# Patient Record
Sex: MALE | Race: WHITE | NOT HISPANIC OR LATINO | URBAN - METROPOLITAN AREA
[De-identification: names, ages, dates, MRNs, and addresses within clinical notes are randomized per-mention and may not be internally consistent; named-entity substitution may affect disease eponyms.]

---

## 2018-01-26 ENCOUNTER — OUTPATIENT (OUTPATIENT)
Dept: OUTPATIENT SERVICES | Facility: HOSPITAL | Age: 78
LOS: 1 days | End: 2018-01-26
Payer: MEDICARE

## 2018-01-26 DIAGNOSIS — Z98.89 OTHER SPECIFIED POSTPROCEDURAL STATES: Chronic | ICD-10-CM

## 2018-01-26 DIAGNOSIS — M17.0 BILATERAL PRIMARY OSTEOARTHRITIS OF KNEE: ICD-10-CM

## 2018-01-26 DIAGNOSIS — Z51.89 ENCOUNTER FOR OTHER SPECIFIED AFTERCARE: ICD-10-CM

## 2018-02-12 PROCEDURE — 97110 THERAPEUTIC EXERCISES: CPT

## 2018-02-12 PROCEDURE — 97162 PT EVAL MOD COMPLEX 30 MIN: CPT

## 2018-02-12 PROCEDURE — G8978: CPT | Mod: CL

## 2018-02-12 PROCEDURE — G8979: CPT | Mod: CK

## 2018-02-12 PROCEDURE — 97010 HOT OR COLD PACKS THERAPY: CPT

## 2018-10-04 ENCOUNTER — OUTPATIENT (OUTPATIENT)
Dept: OUTPATIENT SERVICES | Facility: HOSPITAL | Age: 78
LOS: 1 days | End: 2018-10-04
Payer: MEDICARE

## 2018-10-04 DIAGNOSIS — Z98.89 OTHER SPECIFIED POSTPROCEDURAL STATES: Chronic | ICD-10-CM

## 2018-10-04 DIAGNOSIS — Z96.651 PRESENCE OF RIGHT ARTIFICIAL KNEE JOINT: ICD-10-CM

## 2018-10-04 DIAGNOSIS — Z51.89 ENCOUNTER FOR OTHER SPECIFIED AFTERCARE: ICD-10-CM

## 2018-12-19 PROCEDURE — 97140 MANUAL THERAPY 1/> REGIONS: CPT

## 2018-12-19 PROCEDURE — G8979: CPT | Mod: CJ

## 2018-12-19 PROCEDURE — 97161 PT EVAL LOW COMPLEX 20 MIN: CPT

## 2018-12-19 PROCEDURE — G8980: CPT | Mod: CK

## 2018-12-19 PROCEDURE — 97110 THERAPEUTIC EXERCISES: CPT

## 2018-12-19 PROCEDURE — G8978: CPT | Mod: CK

## 2018-12-19 PROCEDURE — 97010 HOT OR COLD PACKS THERAPY: CPT

## 2019-03-01 ENCOUNTER — APPOINTMENT (OUTPATIENT)
Dept: PULMONOLOGY | Facility: CLINIC | Age: 79
End: 2019-03-01
Payer: MEDICARE

## 2019-03-01 PROCEDURE — 94727 GAS DIL/WSHOT DETER LNG VOL: CPT

## 2019-03-01 PROCEDURE — 94729 DIFFUSING CAPACITY: CPT

## 2019-03-01 PROCEDURE — 94010 BREATHING CAPACITY TEST: CPT

## 2019-03-01 PROCEDURE — 85018 HEMOGLOBIN: CPT | Mod: QW

## 2021-07-09 ENCOUNTER — EMERGENCY (EMERGENCY)
Facility: HOSPITAL | Age: 81
LOS: 1 days | Discharge: HOME | End: 2021-07-09
Attending: EMERGENCY MEDICINE
Payer: MEDICARE

## 2021-07-09 VITALS
HEART RATE: 69 BPM | TEMPERATURE: 98 F | DIASTOLIC BLOOD PRESSURE: 84 MMHG | RESPIRATION RATE: 18 BRPM | SYSTOLIC BLOOD PRESSURE: 157 MMHG | OXYGEN SATURATION: 96 % | WEIGHT: 208.56 LBS | HEIGHT: 70 IN

## 2021-07-09 DIAGNOSIS — Z98.89 OTHER SPECIFIED POSTPROCEDURAL STATES: Chronic | ICD-10-CM

## 2021-07-09 LAB
ALBUMIN SERPL ELPH-MCNC: 3.7 G/DL — SIGNIFICANT CHANGE UP (ref 3.3–5.2)
ALP SERPL-CCNC: 110 U/L — SIGNIFICANT CHANGE UP (ref 40–120)
ALT FLD-CCNC: 54 U/L — HIGH
ANION GAP SERPL CALC-SCNC: 13 MMOL/L — SIGNIFICANT CHANGE UP (ref 5–17)
APPEARANCE UR: ABNORMAL
APTT BLD: 34.2 SEC — SIGNIFICANT CHANGE UP (ref 27.5–35.5)
AST SERPL-CCNC: 48 U/L — HIGH
BACTERIA # UR AUTO: ABNORMAL
BASOPHILS # BLD AUTO: 0.03 K/UL — SIGNIFICANT CHANGE UP (ref 0–0.2)
BASOPHILS NFR BLD AUTO: 0.3 % — SIGNIFICANT CHANGE UP (ref 0–2)
BILIRUB SERPL-MCNC: 0.5 MG/DL — SIGNIFICANT CHANGE UP (ref 0.4–2)
BILIRUB UR-MCNC: NEGATIVE — SIGNIFICANT CHANGE UP
BLD GP AB SCN SERPL QL: SIGNIFICANT CHANGE UP
BUN SERPL-MCNC: 24.4 MG/DL — HIGH (ref 8–20)
CALCIUM SERPL-MCNC: 8.9 MG/DL — SIGNIFICANT CHANGE UP (ref 8.6–10.2)
CHLORIDE SERPL-SCNC: 101 MMOL/L — SIGNIFICANT CHANGE UP (ref 98–107)
CO2 SERPL-SCNC: 24 MMOL/L — SIGNIFICANT CHANGE UP (ref 22–29)
COLOR SPEC: YELLOW — SIGNIFICANT CHANGE UP
CREAT SERPL-MCNC: 1 MG/DL — SIGNIFICANT CHANGE UP (ref 0.5–1.3)
CRP SERPL-MCNC: 142 MG/L — HIGH
DIFF PNL FLD: ABNORMAL
EOSINOPHIL # BLD AUTO: 0.05 K/UL — SIGNIFICANT CHANGE UP (ref 0–0.5)
EOSINOPHIL NFR BLD AUTO: 0.4 % — SIGNIFICANT CHANGE UP (ref 0–6)
EPI CELLS # UR: SIGNIFICANT CHANGE UP
GLUCOSE SERPL-MCNC: 136 MG/DL — HIGH (ref 70–99)
GLUCOSE UR QL: NEGATIVE — SIGNIFICANT CHANGE UP
HCT VFR BLD CALC: 41.3 % — SIGNIFICANT CHANGE UP (ref 39–50)
HGB BLD-MCNC: 13.7 G/DL — SIGNIFICANT CHANGE UP (ref 13–17)
IMM GRANULOCYTES NFR BLD AUTO: 0.8 % — SIGNIFICANT CHANGE UP (ref 0–1.5)
INR BLD: 1.31 RATIO — HIGH (ref 0.88–1.16)
KETONES UR-MCNC: ABNORMAL
LACTATE BLDV-MCNC: 1.2 MMOL/L — SIGNIFICANT CHANGE UP (ref 0.5–2)
LEUKOCYTE ESTERASE UR-ACNC: ABNORMAL
LYMPHOCYTES # BLD AUTO: 0.97 K/UL — LOW (ref 1–3.3)
LYMPHOCYTES # BLD AUTO: 8.3 % — LOW (ref 13–44)
MCHC RBC-ENTMCNC: 31.4 PG — SIGNIFICANT CHANGE UP (ref 27–34)
MCHC RBC-ENTMCNC: 33.2 GM/DL — SIGNIFICANT CHANGE UP (ref 32–36)
MCV RBC AUTO: 94.7 FL — SIGNIFICANT CHANGE UP (ref 80–100)
MONOCYTES # BLD AUTO: 1.27 K/UL — HIGH (ref 0–0.9)
MONOCYTES NFR BLD AUTO: 10.9 % — SIGNIFICANT CHANGE UP (ref 2–14)
NEUTROPHILS # BLD AUTO: 9.23 K/UL — HIGH (ref 1.8–7.4)
NEUTROPHILS NFR BLD AUTO: 79.3 % — HIGH (ref 43–77)
NITRITE UR-MCNC: POSITIVE
PH UR: 6 — SIGNIFICANT CHANGE UP (ref 5–8)
PLATELET # BLD AUTO: 263 K/UL — SIGNIFICANT CHANGE UP (ref 150–400)
POTASSIUM SERPL-MCNC: 3.9 MMOL/L — SIGNIFICANT CHANGE UP (ref 3.5–5.3)
POTASSIUM SERPL-SCNC: 3.9 MMOL/L — SIGNIFICANT CHANGE UP (ref 3.5–5.3)
PROT SERPL-MCNC: 7.2 G/DL — SIGNIFICANT CHANGE UP (ref 6.6–8.7)
PROT UR-MCNC: 30 MG/DL
PROTHROM AB SERPL-ACNC: 15 SEC — HIGH (ref 10.6–13.6)
RBC # BLD: 4.36 M/UL — SIGNIFICANT CHANGE UP (ref 4.2–5.8)
RBC # FLD: 13.2 % — SIGNIFICANT CHANGE UP (ref 10.3–14.5)
RBC CASTS # UR COMP ASSIST: ABNORMAL /HPF (ref 0–4)
SODIUM SERPL-SCNC: 138 MMOL/L — SIGNIFICANT CHANGE UP (ref 135–145)
SP GR SPEC: 1.02 — SIGNIFICANT CHANGE UP (ref 1.01–1.02)
UROBILINOGEN FLD QL: 1
WBC # BLD: 11.64 K/UL — HIGH (ref 3.8–10.5)
WBC # FLD AUTO: 11.64 K/UL — HIGH (ref 3.8–10.5)
WBC UR QL: >50

## 2021-07-09 PROCEDURE — 99283 EMERGENCY DEPT VISIT LOW MDM: CPT

## 2021-07-09 PROCEDURE — 99285 EMERGENCY DEPT VISIT HI MDM: CPT

## 2021-07-09 PROCEDURE — 76870 US EXAM SCROTUM: CPT | Mod: 26

## 2021-07-09 RX ORDER — TAMSULOSIN HYDROCHLORIDE 0.4 MG/1
0.4 CAPSULE ORAL ONCE
Refills: 0 | Status: COMPLETED | OUTPATIENT
Start: 2021-07-09 | End: 2021-07-09

## 2021-07-09 RX ORDER — MORPHINE SULFATE 50 MG/1
4 CAPSULE, EXTENDED RELEASE ORAL ONCE
Refills: 0 | Status: DISCONTINUED | OUTPATIENT
Start: 2021-07-09 | End: 2021-07-09

## 2021-07-09 RX ORDER — SODIUM CHLORIDE 9 MG/ML
1000 INJECTION, SOLUTION INTRAVENOUS ONCE
Refills: 0 | Status: COMPLETED | OUTPATIENT
Start: 2021-07-09 | End: 2021-07-09

## 2021-07-09 RX ORDER — CIPROFLOXACIN LACTATE 400MG/40ML
500 VIAL (ML) INTRAVENOUS ONCE
Refills: 0 | Status: COMPLETED | OUTPATIENT
Start: 2021-07-09 | End: 2021-07-09

## 2021-07-09 RX ORDER — PIPERACILLIN AND TAZOBACTAM 4; .5 G/20ML; G/20ML
3.38 INJECTION, POWDER, LYOPHILIZED, FOR SOLUTION INTRAVENOUS ONCE
Refills: 0 | Status: COMPLETED | OUTPATIENT
Start: 2021-07-09 | End: 2021-07-09

## 2021-07-09 RX ORDER — SODIUM CHLORIDE 9 MG/ML
3 INJECTION INTRAMUSCULAR; INTRAVENOUS; SUBCUTANEOUS ONCE
Refills: 0 | Status: COMPLETED | OUTPATIENT
Start: 2021-07-09 | End: 2021-07-09

## 2021-07-09 RX ADMIN — MORPHINE SULFATE 4 MILLIGRAM(S): 50 CAPSULE, EXTENDED RELEASE ORAL at 21:04

## 2021-07-09 RX ADMIN — SODIUM CHLORIDE 1000 MILLILITER(S): 9 INJECTION, SOLUTION INTRAVENOUS at 23:15

## 2021-07-09 RX ADMIN — PIPERACILLIN AND TAZOBACTAM 200 GRAM(S): 4; .5 INJECTION, POWDER, LYOPHILIZED, FOR SOLUTION INTRAVENOUS at 21:05

## 2021-07-09 RX ADMIN — SODIUM CHLORIDE 3 MILLILITER(S): 9 INJECTION INTRAMUSCULAR; INTRAVENOUS; SUBCUTANEOUS at 21:07

## 2021-07-09 NOTE — ED PROVIDER NOTE - NSFOLLOWUPINSTRUCTIONS_ED_ALL_ED_FT
Cipro 500 mg 2x daily for next 14 days  Flomax 0.4 mg daily at bedtime  Follow up with Urology within 1 week-call to schedule appt  Return sooner for any problems      Urinary Tract Infection    A urinary tract infection (UTI) is an infection of any part of the urinary tract, which includes the kidneys, ureters, bladder, and urethra. Risk factors include ignoring your need to urinate, wiping back to front if female, being an uncircumcised male, and having diabetes or a weak immune system. Symptoms include frequent urination, pain or burning with urination, foul smelling urine, cloudy urine, pain in the lower abdomen, blood in the urine, and fever. If you were prescribed an antibiotic medicine, take it as told by your health care provider. Do not stop taking the antibiotic even if you start to feel better.    SEEK IMMEDIATE MEDICAL CARE IF YOU HAVE ANY OF THE FOLLOWING SYMPTOMS: severe back or abdominal pain, fever, inability to keep fluids or medicine down, dizziness/lightheadedness, or a change in mental status.

## 2021-07-09 NOTE — CONSULT NOTE ADULT - ASSESSMENT
81 year old male with complex left hydrocele and cellulitis/edema  able to void spontaneously without evidence of urinary retention; post void residual 33cc    -oral cipro for 14 days  -pain medication  -start tamsulosin  -warm baths  -follow up urology in 1 week

## 2021-07-09 NOTE — ED PROVIDER NOTE - CLINICAL SUMMARY MEDICAL DECISION MAKING FREE TEXT BOX
will check labs, iv abx, probable ct and urology consult will check labs, iv abx, testicular US, possible ct and urology consult

## 2021-07-09 NOTE — ED PROVIDER NOTE - CPE EDP GASTRO NORM
Emergency Department  [address] [place] [id number]  Phone:  [phone]  Fax:  [phone]                                    Mayte Schwab   MRN: [id number]    Department:   Emergency Department   Date of Visit:  6/17/2020           After Visit Summary Signature Page    I have received my discharge instructions, and my questions have been answered. I have discussed any challenges I see with this plan with the nurse or doctor.    ..........................................................................................................................................  Patient/Patient Representative Signature      ..........................................................................................................................................  Patient Representative Print Name and Relationship to Patient    ..................................................               ................................................  Date                                   Time    ..........................................................................................................................................  Reviewed by Signature/Title    ...................................................              ..............................................  Date                                               Time          22EPIC Rev 08/18        normal...

## 2021-07-09 NOTE — ED PROVIDER NOTE - PATIENT PORTAL LINK FT
You can access the FollowMyHealth Patient Portal offered by [facility] by registering at the following website: [email or web address]. By joining [employer]’s FollowMyHealth portal, you will also be able to view your health information using other applications (apps) compatible with our system.

## 2021-07-09 NOTE — CONSULT NOTE ADULT - ATTENDING COMMENTS
agree with assessment as discussed  1. patient was able to void with small PVR. no need for drainage  2. likely epididymitis, with suspected cellulitis, no need for any drainage, patient discharged with antibiotic treatment, recommended warm baths too  will follow up in 1 week to observe improvement and need for further treatment

## 2021-07-09 NOTE — ED PROVIDER NOTE - PROGRESS NOTE DETAILS
Labs and US results as noted.  Seen by Surgery covering Urology and  d/w Urology/Dr. Sepulveda and recommending d/c on  Cipro and Flomax with f/u in office as outpt

## 2021-07-09 NOTE — ED ADULT NURSE NOTE - OBJECTIVE STATEMENT
Pt. A&Ox3. Pt. respirations even and unlabored. Pt. complaining of scrotal swelling and decreased urine output over the last 4 days. Pt. states he hasn't been able to urinate since 12 pm.

## 2021-07-09 NOTE — ED PROVIDER NOTE - OBJECTIVE STATEMENT
81y male ot with pmhx of afib, BPH, HLD, HTN presents to ED c/o worsening testicular pain with swelling and urinary retention. Pt states he was referred from MD for worsening difficulty passing urine. Pt states he has been passing minimal urine for the past 3/4 days but has been unable to pass urine completely since 12pm today.

## 2021-07-09 NOTE — ED PROVIDER NOTE - CARE PROVIDER_API CALL
Shalonda Sepulveda)  [place] [address]  Phone: [phone]  Fax: [phone]  Follow Up Time:
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
## 2021-07-09 NOTE — CONSULT NOTE ADULT - SUBJECTIVE AND OBJECTIVE BOX
81 year old male presenting to ED with 4 day history of left scrotal swelling and pain. He has never experienced symptoms like this before. Does not recall any skin lesion, scrape or trauma to the area. Has noticed it has been more difficult to urinate, but he is still able to urinate independently. no discharge or drainage from perineum. Has not extended beyond perineum. Has not seen a urologist in many years for which he underwent a bladder diverticulectomy and simple prostatectomy.     denies fevers, chills, chest pain, shortness of breath, palpitations, nausea/vomiting, abdominal pain    PMH: afib, BPH, HLD, HTN  PSH: inguinal hernia, left hip repair, right knee arthroscopy   Medications: as per med rec  Allergies: NKDA  sh: denies toxic habits            Vital Signs Last 24 Hrs  T(C): 36.8 (2021 20:06), Max: 36.8 (2021 20:06)  T(F): 98.3 (2021 20:06), Max: 98.3 (2021 20:06)  HR: 69 (2021 20:06) (69 - 69)  BP: 157/84 (2021 20:06) (157/84 - 157/84)  BP(mean): --  RR: 18 (2021 20:06) (18 - 18)  SpO2: 96% (2021 20:06) (96% - 96%)    Physical Exam:    general: no acute distress, aox3    Respiratory: Breath Sounds equal & clear to auscultation, no accessory muscle use    Cardiovascular: Regular rate & rhythm, normal S1, S2; no murmurs, gallops or rubs    Gastrointestinal: Soft, non-tender, nondistended. mild suprapubic fullness     : no suprapubic tenderness, shaft of penis without edema, mild edema of prepuce, urethra visualized. left scrotum tender to palpation with associated edema, erythema. no drainage. nontender right scrotum. no cellulitis beyond scrotal region. no progression to bilateral groins/thighs     Extremities: No peripheral edema, No cyanosis, clubbing         I&O's Detail      LABS:                        13.7   11.64 )-----------( 263      ( 2021 21:01 )             41.3     07-09    138  |  101  |  24.4<H>  ----------------------------<  136<H>  3.9   |  24.0  |  1.00    Ca    8.9      2021 21:01    TPro  7.2  /  Alb  3.7  /  TBili  0.5  /  DBili  x   /  AST  48<H>  /  ALT  54<H>  /  AlkPhos  110      PT/INR - ( 2021 21:01 )   PT: 15.0 sec;   INR: 1.31 ratio         PTT - ( 2021 21:01 )  PTT:34.2 sec  Urinalysis Basic - ( 2021 22:46 )    Color: Yellow / Appearance: Slightly Turbid / S.020 / pH: x  Gluc: x / Ketone: Small  / Bili: Negative / Urobili: 1   Blood: x / Protein: 30 mg/dL / Nitrite: Positive   Leuk Esterase: Moderate / RBC: 3-5 /HPF / WBC >50   Sq Epi: x / Non Sq Epi: Few / Bacteria: Many        RADIOLOGY & ADDITIONAL STUDIES:    testicular US : FINDINGS:    RIGHT:  Right testis: 3.7 cm x 3.1 cm x  3.2 cm. Normal echogenicity and echotexture with no masses or areas of architectural distortion. Normal arterial and venous blood flow pattern.  Right epididymis: Within normal limits.  Right hydrocele: Mild  Right varicocele: None.    LEFT:  Left testis: 3.0 cm x 2.4 cm x 2.7 cm. Normal echogenicity and echotexture with no masses or areas of architectural distortion. Normal arterial and venous blood flow pattern.  Left epididymis: Not clearly visualized  Left hydrocele: Moderate complex with septations.  Left varicocele: None.    Other: Diffuse scrotal thickening. No abnormal fluid collections.    IMPRESSION:    No evidence of testicular torsion or abnormal testicular echotexture. Left epididymis not clearly visualized likely secondary to epididymitis. Complex left hydrocele and mild right hydrocele. Diffuse scrotal thickening, correlate for cellulitis or edema.

## 2021-07-09 NOTE — ED PROVIDER NOTE - PSH
S/P left knee arthroscopy  1994  S/P right knee arthroscopy  1990  Status post hip surgery  left hip repair 2013

## 2021-07-10 VITALS
SYSTOLIC BLOOD PRESSURE: 174 MMHG | OXYGEN SATURATION: 95 % | TEMPERATURE: 99 F | DIASTOLIC BLOOD PRESSURE: 89 MMHG | HEART RATE: 66 BPM | RESPIRATION RATE: 18 BRPM

## 2021-07-10 PROCEDURE — 83605 ASSAY OF LACTIC ACID: CPT

## 2021-07-10 PROCEDURE — 87077 CULTURE AEROBIC IDENTIFY: CPT

## 2021-07-10 PROCEDURE — 85610 PROTHROMBIN TIME: CPT

## 2021-07-10 PROCEDURE — 87186 SC STD MICRODIL/AGAR DIL: CPT

## 2021-07-10 PROCEDURE — 96374 THER/PROPH/DIAG INJ IV PUSH: CPT

## 2021-07-10 PROCEDURE — 81001 URINALYSIS AUTO W/SCOPE: CPT

## 2021-07-10 PROCEDURE — 80053 COMPREHEN METABOLIC PANEL: CPT

## 2021-07-10 PROCEDURE — 86140 C-REACTIVE PROTEIN: CPT

## 2021-07-10 PROCEDURE — 96375 TX/PRO/DX INJ NEW DRUG ADDON: CPT

## 2021-07-10 PROCEDURE — 36415 COLL VENOUS BLD VENIPUNCTURE: CPT

## 2021-07-10 PROCEDURE — 76870 US EXAM SCROTUM: CPT

## 2021-07-10 PROCEDURE — 86901 BLOOD TYPING SEROLOGIC RH(D): CPT

## 2021-07-10 PROCEDURE — 85730 THROMBOPLASTIN TIME PARTIAL: CPT

## 2021-07-10 PROCEDURE — 86850 RBC ANTIBODY SCREEN: CPT

## 2021-07-10 PROCEDURE — 85025 COMPLETE CBC W/AUTO DIFF WBC: CPT

## 2021-07-10 PROCEDURE — 99284 EMERGENCY DEPT VISIT MOD MDM: CPT | Mod: 25

## 2021-07-10 PROCEDURE — 86900 BLOOD TYPING SEROLOGIC ABO: CPT

## 2021-07-10 PROCEDURE — 87086 URINE CULTURE/COLONY COUNT: CPT

## 2021-07-10 RX ORDER — TAMSULOSIN HYDROCHLORIDE 0.4 MG/1
1 CAPSULE ORAL
Qty: 14 | Refills: 0
Start: 2021-07-10 | End: 2021-07-23

## 2021-07-10 RX ORDER — MOXIFLOXACIN HYDROCHLORIDE TABLETS, 400 MG 400 MG/1
1 TABLET, FILM COATED ORAL
Qty: 28 | Refills: 0
Start: 2021-07-10 | End: 2021-07-23

## 2021-07-10 RX ADMIN — TAMSULOSIN HYDROCHLORIDE 0.4 MILLIGRAM(S): 0.4 CAPSULE ORAL at 00:07

## 2021-07-10 RX ADMIN — Medication 500 MILLIGRAM(S): at 00:07

## 2021-07-10 NOTE — ED ADULT NURSE REASSESSMENT NOTE - NS ED NURSE REASSESS COMMENT FT1
33post void ML in bladder after urinating 200ml on his own after initial bladder scan. . Pt. also urinated again after bolus

## 2021-07-23 ENCOUNTER — INPATIENT (INPATIENT)
Facility: HOSPITAL | Age: 81
LOS: 1 days | Discharge: HOME | DRG: 312 | End: 2021-07-25
Attending: STUDENT IN AN ORGANIZED HEALTH CARE EDUCATION/TRAINING PROGRAM | Admitting: HOSPITALIST
Payer: MEDICARE

## 2021-07-23 VITALS
TEMPERATURE: 98 F | RESPIRATION RATE: 18 BRPM | HEART RATE: 68 BPM | SYSTOLIC BLOOD PRESSURE: 129 MMHG | DIASTOLIC BLOOD PRESSURE: 83 MMHG | WEIGHT: 214.95 LBS | OXYGEN SATURATION: 97 % | HEIGHT: 70 IN

## 2021-07-23 DIAGNOSIS — R55 SYNCOPE AND COLLAPSE: ICD-10-CM

## 2021-07-23 DIAGNOSIS — Z98.89 OTHER SPECIFIED POSTPROCEDURAL STATES: Chronic | ICD-10-CM

## 2021-07-23 LAB
ALBUMIN SERPL ELPH-MCNC: 3.7 G/DL — SIGNIFICANT CHANGE UP (ref 3.3–5.2)
ALP SERPL-CCNC: 101 U/L — SIGNIFICANT CHANGE UP (ref 40–120)
ALT FLD-CCNC: 30 U/L — SIGNIFICANT CHANGE UP
ANION GAP SERPL CALC-SCNC: 11 MMOL/L — SIGNIFICANT CHANGE UP (ref 5–17)
APPEARANCE UR: CLEAR — SIGNIFICANT CHANGE UP
AST SERPL-CCNC: 24 U/L — SIGNIFICANT CHANGE UP
BACTERIA # UR AUTO: NEGATIVE — SIGNIFICANT CHANGE UP
BASOPHILS # BLD AUTO: 0.04 K/UL — SIGNIFICANT CHANGE UP (ref 0–0.2)
BASOPHILS NFR BLD AUTO: 0.5 % — SIGNIFICANT CHANGE UP (ref 0–2)
BILIRUB SERPL-MCNC: 0.3 MG/DL — LOW (ref 0.4–2)
BILIRUB UR-MCNC: NEGATIVE — SIGNIFICANT CHANGE UP
BUN SERPL-MCNC: 18.3 MG/DL — SIGNIFICANT CHANGE UP (ref 8–20)
CALCIUM SERPL-MCNC: 8.9 MG/DL — SIGNIFICANT CHANGE UP (ref 8.6–10.2)
CHLORIDE SERPL-SCNC: 99 MMOL/L — SIGNIFICANT CHANGE UP (ref 98–107)
CO2 SERPL-SCNC: 24 MMOL/L — SIGNIFICANT CHANGE UP (ref 22–29)
COLOR SPEC: YELLOW — SIGNIFICANT CHANGE UP
CREAT SERPL-MCNC: 1.05 MG/DL — SIGNIFICANT CHANGE UP (ref 0.5–1.3)
DIFF PNL FLD: NEGATIVE — SIGNIFICANT CHANGE UP
EOSINOPHIL # BLD AUTO: 0.15 K/UL — SIGNIFICANT CHANGE UP (ref 0–0.5)
EOSINOPHIL NFR BLD AUTO: 2.1 % — SIGNIFICANT CHANGE UP (ref 0–6)
EPI CELLS # UR: NEGATIVE — SIGNIFICANT CHANGE UP
GLUCOSE SERPL-MCNC: 101 MG/DL — HIGH (ref 70–99)
GLUCOSE UR QL: NEGATIVE MG/DL — SIGNIFICANT CHANGE UP
HCT VFR BLD CALC: 42.5 % — SIGNIFICANT CHANGE UP (ref 39–50)
HGB BLD-MCNC: 13.9 G/DL — SIGNIFICANT CHANGE UP (ref 13–17)
IMM GRANULOCYTES NFR BLD AUTO: 0.7 % — SIGNIFICANT CHANGE UP (ref 0–1.5)
KETONES UR-MCNC: NEGATIVE — SIGNIFICANT CHANGE UP
LEUKOCYTE ESTERASE UR-ACNC: ABNORMAL
LYMPHOCYTES # BLD AUTO: 1.11 K/UL — SIGNIFICANT CHANGE UP (ref 1–3.3)
LYMPHOCYTES # BLD AUTO: 15.2 % — SIGNIFICANT CHANGE UP (ref 13–44)
MCHC RBC-ENTMCNC: 30.9 PG — SIGNIFICANT CHANGE UP (ref 27–34)
MCHC RBC-ENTMCNC: 32.7 GM/DL — SIGNIFICANT CHANGE UP (ref 32–36)
MCV RBC AUTO: 94.4 FL — SIGNIFICANT CHANGE UP (ref 80–100)
MONOCYTES # BLD AUTO: 0.63 K/UL — SIGNIFICANT CHANGE UP (ref 0–0.9)
MONOCYTES NFR BLD AUTO: 8.6 % — SIGNIFICANT CHANGE UP (ref 2–14)
NEUTROPHILS # BLD AUTO: 5.32 K/UL — SIGNIFICANT CHANGE UP (ref 1.8–7.4)
NEUTROPHILS NFR BLD AUTO: 72.9 % — SIGNIFICANT CHANGE UP (ref 43–77)
NITRITE UR-MCNC: NEGATIVE — SIGNIFICANT CHANGE UP
PH UR: 6 — SIGNIFICANT CHANGE UP (ref 5–8)
PLATELET # BLD AUTO: 303 K/UL — SIGNIFICANT CHANGE UP (ref 150–400)
POTASSIUM SERPL-MCNC: 4.3 MMOL/L — SIGNIFICANT CHANGE UP (ref 3.5–5.3)
POTASSIUM SERPL-SCNC: 4.3 MMOL/L — SIGNIFICANT CHANGE UP (ref 3.5–5.3)
PROT SERPL-MCNC: 6.9 G/DL — SIGNIFICANT CHANGE UP (ref 6.6–8.7)
PROT UR-MCNC: 15 MG/DL
RBC # BLD: 4.5 M/UL — SIGNIFICANT CHANGE UP (ref 4.2–5.8)
RBC # FLD: 13.2 % — SIGNIFICANT CHANGE UP (ref 10.3–14.5)
RBC CASTS # UR COMP ASSIST: NEGATIVE /HPF — SIGNIFICANT CHANGE UP (ref 0–4)
SARS-COV-2 RNA SPEC QL NAA+PROBE: SIGNIFICANT CHANGE UP
SODIUM SERPL-SCNC: 133 MMOL/L — LOW (ref 135–145)
SP GR SPEC: 1.02 — SIGNIFICANT CHANGE UP (ref 1.01–1.02)
TROPONIN T SERPL-MCNC: <0.01 NG/ML — SIGNIFICANT CHANGE UP (ref 0–0.06)
UROBILINOGEN FLD QL: NEGATIVE MG/DL — SIGNIFICANT CHANGE UP
WBC # BLD: 7.3 K/UL — SIGNIFICANT CHANGE UP (ref 3.8–10.5)
WBC # FLD AUTO: 7.3 K/UL — SIGNIFICANT CHANGE UP (ref 3.8–10.5)
WBC UR QL: SIGNIFICANT CHANGE UP

## 2021-07-23 PROCEDURE — 70450 CT HEAD/BRAIN W/O DYE: CPT | Mod: 26,MA

## 2021-07-23 PROCEDURE — 71045 X-RAY EXAM CHEST 1 VIEW: CPT | Mod: 26

## 2021-07-23 PROCEDURE — 93306 TTE W/DOPPLER COMPLETE: CPT | Mod: 26

## 2021-07-23 PROCEDURE — 99220: CPT

## 2021-07-23 PROCEDURE — 93010 ELECTROCARDIOGRAM REPORT: CPT | Mod: 76

## 2021-07-23 PROCEDURE — 99497 ADVNCD CARE PLAN 30 MIN: CPT | Mod: 25

## 2021-07-23 PROCEDURE — 99223 1ST HOSP IP/OBS HIGH 75: CPT

## 2021-07-23 RX ORDER — ASPIRIN/CALCIUM CARB/MAGNESIUM 324 MG
81 TABLET ORAL DAILY
Refills: 0 | Status: DISCONTINUED | OUTPATIENT
Start: 2021-07-23 | End: 2021-07-25

## 2021-07-23 RX ORDER — DONEPEZIL HYDROCHLORIDE 10 MG/1
10 TABLET, FILM COATED ORAL
Refills: 0 | Status: DISCONTINUED | OUTPATIENT
Start: 2021-07-23 | End: 2021-07-25

## 2021-07-23 RX ORDER — MEMANTINE HYDROCHLORIDE 10 MG/1
10 TABLET ORAL DAILY
Refills: 0 | Status: DISCONTINUED | OUTPATIENT
Start: 2021-07-23 | End: 2021-07-25

## 2021-07-23 RX ORDER — SENNA PLUS 8.6 MG/1
2 TABLET ORAL AT BEDTIME
Refills: 0 | Status: DISCONTINUED | OUTPATIENT
Start: 2021-07-23 | End: 2021-07-25

## 2021-07-23 RX ORDER — AMIODARONE HYDROCHLORIDE 400 MG/1
200 TABLET ORAL DAILY
Refills: 0 | Status: DISCONTINUED | OUTPATIENT
Start: 2021-07-23 | End: 2021-07-25

## 2021-07-23 RX ORDER — ENOXAPARIN SODIUM 100 MG/ML
40 INJECTION SUBCUTANEOUS DAILY
Refills: 0 | Status: DISCONTINUED | OUTPATIENT
Start: 2021-07-23 | End: 2021-07-25

## 2021-07-23 RX ORDER — SIMVASTATIN 20 MG/1
40 TABLET, FILM COATED ORAL AT BEDTIME
Refills: 0 | Status: DISCONTINUED | OUTPATIENT
Start: 2021-07-23 | End: 2021-07-25

## 2021-07-23 RX ORDER — METOPROLOL TARTRATE 50 MG
50 TABLET ORAL DAILY
Refills: 0 | Status: DISCONTINUED | OUTPATIENT
Start: 2021-07-23 | End: 2021-07-25

## 2021-07-23 RX ORDER — TAMSULOSIN HYDROCHLORIDE 0.4 MG/1
0.4 CAPSULE ORAL AT BEDTIME
Refills: 0 | Status: DISCONTINUED | OUTPATIENT
Start: 2021-07-23 | End: 2021-07-25

## 2021-07-23 RX ORDER — CIPROFLOXACIN LACTATE 400MG/40ML
500 VIAL (ML) INTRAVENOUS EVERY 12 HOURS
Refills: 0 | Status: COMPLETED | OUTPATIENT
Start: 2021-07-23 | End: 2021-07-24

## 2021-07-23 RX ADMIN — TAMSULOSIN HYDROCHLORIDE 0.4 MILLIGRAM(S): 0.4 CAPSULE ORAL at 22:21

## 2021-07-23 RX ADMIN — DONEPEZIL HYDROCHLORIDE 10 MILLIGRAM(S): 10 TABLET, FILM COATED ORAL at 18:59

## 2021-07-23 RX ADMIN — Medication 500 MILLIGRAM(S): at 18:59

## 2021-07-23 RX ADMIN — SIMVASTATIN 40 MILLIGRAM(S): 20 TABLET, FILM COATED ORAL at 22:21

## 2021-07-23 NOTE — ED CDU PROVIDER INITIAL DAY NOTE - MEDICAL DECISION MAKING DETAILS
81 year old male with PMh afib, htn, hld, dementia  presents with syncope. pt is been seen in [facility] and is been tx for Uti . As per EMS, the pt went to a deli that he goes to daily. He seemed to be confused as per the deli staff.   R.O syncope or pre syncope   cardiac mon and orthostatic bp   Ekg and trop x 2 ,   telemetry  cbc in am and f.u [place] card ( consult placed by primary care team )

## 2021-07-23 NOTE — ED CDU PROVIDER INITIAL DAY NOTE - PHYSICAL EXAMINATION
Const: AOX3 nontoxic appearing, no apparent respiratory or physical distress.  sister at bed side .   HEENT: NC/AT. Moist mucous membranes.  Eyes: STEVEN. EOMI  Neck: Soft and supple. Full ROM without pain.  Cardiac: Regular rate and regular rhythm. +S1/S2.  Peripheral pulses 2+ and symmetric. No LE edema.  Resp: Speaking in full sentences. No evidence of respiratory distress. No adventitious breath sounds   Abd: Soft, non-tender, non-distended. Normal bowel sounds in all 4 quadrants. No guarding or rebound.  Back: Spine midline and non-tender. No CVAT.  Skin: No rashes, abrasions or lacerations.  Lymph: No cervical lymphadenopathy.  Neuro: Awake, alert & oriented x 2-3. Moves all extremities symmetrically.

## 2021-07-23 NOTE — H&P ADULT - HISTORY OF PRESENT ILLNESS
82 y/o male with PMH of afib, HTN, HLD, NSVT, CAD, dementia was brought to the ED via ambulance for syncope. Patient said he drove himself to deli, while he was on the line waiting to place his order, the staff asked him if he was ok because he did not look right for which he responded yes. Few minute later, he slumped forward on the deli counter. He was unsure if he lost consciousness but did mentioned he lost 6 sec of which he did not recall what happened. As per ED, EMS reported patient was confused on their arrival. He has no fever, chills, nausea, vomiting, chest pain, shortness of breath, abdominal pain, change in bowel/urinary habit, sick contact, recent travel, blurry vision, dizziness, HA, fall, weakness.
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
## 2021-07-23 NOTE — ED CDU PROVIDER INITIAL DAY NOTE - FAMILY HISTORY
Father  Still living? No  Family history of kidney disease, Age at diagnosis: Age Unknown     Mother  Still living? No  Family history of stroke, Age at diagnosis: Age Unknown

## 2021-07-23 NOTE — H&P ADULT - NSICDXPASTSURGICALHX_GEN_ALL_CORE_FT
PAST SURGICAL HISTORY:  S/P left knee arthroscopy 1994    S/P right knee arthroscopy 1990    Status post hip surgery left hip repair 2013

## 2021-07-23 NOTE — H&P ADULT - ASSESSMENT
82 y/o male with PMH of afib, HTN, HLD, NSVT, CAD, dementia was brought to the ED via ambulance for syncope. Patient said he drove himself to [employer], while he was on the line waiting to place his order, the staff asked him if he was ok because he did not look right for which he responded yes. Few minute later, he slumped forward on the deli counter. He was unsure if he lost consciousness but did mentioned he lost 6 sec of which he did not recall what happened. As per ED, EMS reported patient was confused on their arrival.   CT head: no acute intracranial finding, XR: normal. Patient initially placed in observation, cardiology consulted who recommended admit due to patient's cardiac hx; for possible ILR placement on Monday.     Syncope   Admit to telemetry   Troponin x 3 negative   ECG: sinus jaida with 1st degree block   Cardiology consulted   Fall precaution     Afib   Not on AC   Rate controlled   Amiodarone 200mg     HTN/HLD  Metoprolol ER 50mg with holding parameters  Simvastatin 40mg     CAD   Aspirin 81mg   Statin and BB     BPH   Flomax 0.4mg     Dementia   Aricept 10mg     Supportive   DVT prophylaxis: Lovenox   Diet: DASH     Code status: full     Plan of care discussed with patient and niece at bed side

## 2021-07-23 NOTE — H&P ADULT - NSHPPHYSICALEXAM_GEN_ALL_CORE
Vital Signs Last 24 Hrs  T(C): 36.7 (23 Jul 2021 21:58), Max: 36.7 (23 Jul 2021 21:58)  T(F): 98 (23 Jul 2021 21:58), Max: 98 (23 Jul 2021 21:58)  HR: 82 (23 Jul 2021 21:58) (59 - 82)  BP: 131/80 (23 Jul 2021 21:58) (129/83 - 145/70)  BP(mean): --  RR: 18 (23 Jul 2021 21:58) (18 - 18)  SpO2: 98% (23 Jul 2021 21:58) (97% - 98%)

## 2021-07-23 NOTE — ED ADULT TRIAGE NOTE - CHIEF COMPLAINT QUOTE
pt A&Ox 4 BIBA from [facility] after near syncopal episode witnessed by known staff. As per EMS staff reports pt walked into [facility] leaving car door open, blank stared without speaking, then slumped over the counter without LOC which is unlike patient. Upon ED arrival, pt returned to baseline mentation as per EMS. Dr Jackson called to bedside for eval

## 2021-07-23 NOTE — ED ADULT NURSE NOTE - CHIEF COMPLAINT QUOTE
pt A&Ox 4 BIBA from [facility] after near syncopal episode witnessed by known staff. As per EMS staff reports pt walked into [facility] leaving car door open, blank stared without speaking, then slumped over the counter without LOC which is unlike patient. Upon ED arrival, pt returned to baseline mentation as per EMS. Dr Jackson called to bedside for eval

## 2021-07-23 NOTE — H&P ADULT - NSICDXFAMILYHX_GEN_ALL_CORE_FT
FAMILY HISTORY:  Father  Still living? No  Family history of kidney disease, Age at diagnosis: Age Unknown    Mother  Still living? No  Family history of stroke, Age at diagnosis: Age Unknown

## 2021-07-23 NOTE — ED CDU PROVIDER DISPOSITION NOTE - CLINICAL COURSE
pt initially place don obs, but discussed case with cardio Dr. Esparza. pt has a hx of NSVT, CAD with apical MI, and cardio advises ILR on Monday, will admit

## 2021-07-23 NOTE — ED CDU PROVIDER INITIAL DAY NOTE - ATTENDING CONTRIBUTION TO CARE
80 yo M placed in cdu for syncope. pmh htn, hld, dementia, afib.  vss  pe unremarkable    [facility] consult, serial trop, reassess

## 2021-07-23 NOTE — ED ADULT NURSE NOTE - OBJECTIVE STATEMENT
Pt reports he is forgetful often and that his memory lately has been bad. Pt states he remembers going to deli and became weak and leaned on the counter and was then helped to sit down by the staff. Pt states he feels fine at time of RN assessment and there are no focal deficits noted. Pt denies LOC and is noted to be speaking coherently and following commands. Pt able to maex4 with equal strength and purpose.
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
## 2021-07-23 NOTE — ED PROVIDER NOTE - OBJECTIVE STATEMENT
81 year old male with PMh afib, htn, hld presents with syncope. As per EMS, the pt went to a deli that he goes to daily. He seemed to be confused as per the deli staff. he states he remembers entering the deli and being asked what was wrong. Then, as per bystanders, he slumped forward onto the deli counter. Unclear if he lost consciousness or not but he states that he felt as if he was going to. When EMS arrived, they found him to be confused, which improved by his arrival to the ED. He denies chese pain, palpitations, slurred speech extremity weakness, headache.

## 2021-07-24 DIAGNOSIS — F03.90 UNSPECIFIED DEMENTIA, UNSPECIFIED SEVERITY, WITHOUT BEHAVIORAL DISTURBANCE, PSYCHOTIC DISTURBANCE, MOOD DISTURBANCE, AND ANXIETY: ICD-10-CM

## 2021-07-24 DIAGNOSIS — R55 SYNCOPE AND COLLAPSE: ICD-10-CM

## 2021-07-24 DIAGNOSIS — I48.92 UNSPECIFIED ATRIAL FLUTTER: ICD-10-CM

## 2021-07-24 DIAGNOSIS — E78.5 HYPERLIPIDEMIA, UNSPECIFIED: ICD-10-CM

## 2021-07-24 DIAGNOSIS — I10 ESSENTIAL (PRIMARY) HYPERTENSION: ICD-10-CM

## 2021-07-24 DIAGNOSIS — I25.10 ATHEROSCLEROTIC HEART DISEASE OF NATIVE CORONARY ARTERY WITHOUT ANGINA PECTORIS: ICD-10-CM

## 2021-07-24 DIAGNOSIS — Z02.9 ENCOUNTER FOR ADMINISTRATIVE EXAMINATIONS, UNSPECIFIED: ICD-10-CM

## 2021-07-24 LAB
ANION GAP SERPL CALC-SCNC: 11 MMOL/L — SIGNIFICANT CHANGE UP (ref 5–17)
BUN SERPL-MCNC: 21.4 MG/DL — HIGH (ref 8–20)
CALCIUM SERPL-MCNC: 8.8 MG/DL — SIGNIFICANT CHANGE UP (ref 8.6–10.2)
CHLORIDE SERPL-SCNC: 102 MMOL/L — SIGNIFICANT CHANGE UP (ref 98–107)
CO2 SERPL-SCNC: 23 MMOL/L — SIGNIFICANT CHANGE UP (ref 22–29)
COVID-19 SPIKE DOMAIN AB INTERP: POSITIVE
COVID-19 SPIKE DOMAIN ANTIBODY RESULT: >250 U/ML — HIGH
CREAT SERPL-MCNC: 1.04 MG/DL — SIGNIFICANT CHANGE UP (ref 0.5–1.3)
GLUCOSE SERPL-MCNC: 105 MG/DL — HIGH (ref 70–99)
HCT VFR BLD CALC: 40.3 % — SIGNIFICANT CHANGE UP (ref 39–50)
HGB BLD-MCNC: 13.4 G/DL — SIGNIFICANT CHANGE UP (ref 13–17)
MCHC RBC-ENTMCNC: 31.1 PG — SIGNIFICANT CHANGE UP (ref 27–34)
MCHC RBC-ENTMCNC: 33.3 GM/DL — SIGNIFICANT CHANGE UP (ref 32–36)
MCV RBC AUTO: 93.5 FL — SIGNIFICANT CHANGE UP (ref 80–100)
PLATELET # BLD AUTO: 263 K/UL — SIGNIFICANT CHANGE UP (ref 150–400)
POTASSIUM SERPL-MCNC: 4.1 MMOL/L — SIGNIFICANT CHANGE UP (ref 3.5–5.3)
POTASSIUM SERPL-SCNC: 4.1 MMOL/L — SIGNIFICANT CHANGE UP (ref 3.5–5.3)
RBC # BLD: 4.31 M/UL — SIGNIFICANT CHANGE UP (ref 4.2–5.8)
RBC # FLD: 13.2 % — SIGNIFICANT CHANGE UP (ref 10.3–14.5)
SARS-COV-2 IGG+IGM SERPL QL IA: >250 U/ML — HIGH
SARS-COV-2 IGG+IGM SERPL QL IA: POSITIVE
SODIUM SERPL-SCNC: 136 MMOL/L — SIGNIFICANT CHANGE UP (ref 135–145)
WBC # BLD: 7.75 K/UL — SIGNIFICANT CHANGE UP (ref 3.8–10.5)
WBC # FLD AUTO: 7.75 K/UL — SIGNIFICANT CHANGE UP (ref 3.8–10.5)

## 2021-07-24 PROCEDURE — 93010 ELECTROCARDIOGRAM REPORT: CPT

## 2021-07-24 PROCEDURE — 99232 SBSQ HOSP IP/OBS MODERATE 35: CPT | Mod: GC

## 2021-07-24 RX ORDER — SERTRALINE 25 MG/1
50 TABLET, FILM COATED ORAL DAILY
Refills: 0 | Status: DISCONTINUED | OUTPATIENT
Start: 2021-07-24 | End: 2021-07-25

## 2021-07-24 RX ORDER — ALPRAZOLAM 0.25 MG
0.5 TABLET ORAL THREE TIMES A DAY
Refills: 0 | Status: DISCONTINUED | OUTPATIENT
Start: 2021-07-24 | End: 2021-07-25

## 2021-07-24 RX ADMIN — SIMVASTATIN 40 MILLIGRAM(S): 20 TABLET, FILM COATED ORAL at 23:18

## 2021-07-24 RX ADMIN — Medication 1 TABLET(S): at 05:32

## 2021-07-24 RX ADMIN — TAMSULOSIN HYDROCHLORIDE 0.4 MILLIGRAM(S): 0.4 CAPSULE ORAL at 23:18

## 2021-07-24 RX ADMIN — DONEPEZIL HYDROCHLORIDE 10 MILLIGRAM(S): 10 TABLET, FILM COATED ORAL at 16:38

## 2021-07-24 RX ADMIN — Medication 0.5 MILLIGRAM(S): at 16:38

## 2021-07-24 RX ADMIN — Medication 50 MILLIGRAM(S): at 05:32

## 2021-07-24 RX ADMIN — SERTRALINE 50 MILLIGRAM(S): 25 TABLET, FILM COATED ORAL at 14:37

## 2021-07-24 RX ADMIN — Medication 81 MILLIGRAM(S): at 08:11

## 2021-07-24 RX ADMIN — AMIODARONE HYDROCHLORIDE 200 MILLIGRAM(S): 400 TABLET ORAL at 05:32

## 2021-07-24 RX ADMIN — ENOXAPARIN SODIUM 40 MILLIGRAM(S): 100 INJECTION SUBCUTANEOUS at 08:11

## 2021-07-24 RX ADMIN — MEMANTINE HYDROCHLORIDE 10 MILLIGRAM(S): 10 TABLET ORAL at 08:11

## 2021-07-24 RX ADMIN — Medication 500 MILLIGRAM(S): at 05:32

## 2021-07-24 NOTE — PROGRESS NOTE ADULT - PROBLEM SELECTOR PLAN 7
DVT prophylaxis: Lovenox   Diet: DASH   Dispo: likely over the next couple of days     Code status: full     Updated on plan of care with patient at bedside. All questions and concerns addressed. DVT prophylaxis: Lovenox   Diet: DASH   Dispo: likely over the next couple of days     Code status: full     Updated on plan of care with patient at bedside. All questions and concerns addressed. Sister Zoila aware of plan.

## 2021-07-24 NOTE — PROGRESS NOTE ADULT - PROBLEM SELECTOR PLAN 6
AAOx3, but repetitive at times, likely has underlying dementia  - c/w Aricept 10mg AAOx3, but repetitive at times, likely has underlying dementia  - c/w Aricept 10mg  - also on home Zoloft and Xanax PRN

## 2021-07-24 NOTE — PROGRESS NOTE ADULT - ASSESSMENT
80 y/o male with PMH of afib, HTN, HLD, NSVT, CAD, dementia was brought to the ED via ambulance for syncope. Patient said he drove himself to deli, while he was on the line waiting to place his order, the staff asked him if he was ok because he did not look right for which he responded yes. Few minute later, he slumped forward on the deli counter. He was unsure if he lost consciousness but did mentioned he lost 6 sec of which he did not recall what happened. As per ED, EMS reported patient was confused on their arrival.   CT head: no acute intracranial finding, XR: normal. Patient initially placed in observation, cardiology consulted who recommended admit due to patient's cardiac hx; monitor on tele vs. possible ILR placement on Monday.

## 2021-07-24 NOTE — CONSULT NOTE ADULT - ASSESSMENT
Patient is an 81 y/o man with CAD with remote apical MI with known chronically occluded LAD with collaterals on maximal medical therapy, chronic HFpEF (EF 50%), hypertension, aortic sclerosis without stenosis, non-sustained VT without inducible arrhythmia on an EP study on amiodarone therapy, solitary syncopal episode periMI without prior recurrence who presents with loss of consciousness while in a standing position.    Loss of consciousness/Paroxsymal non-sustained VT  - check orthostatic vitals, labs suggestive of hypovolemia   - TTE  - continue amiodarone and metoprolol   - EKG without acute ischemic changes   - likely for EPS vs ILR given his arrhythmia hx  - telemetry monitoring   - likely for outpatient ischemic evaluation     CAD s/p remote MI/ischemic CM with chronic HFpEF  - euvolemic  - serial cardiac enzymes negative   - continue ASA and statin therapy     Further recommendations pending clinical course

## 2021-07-24 NOTE — PROGRESS NOTE ADULT - PROBLEM SELECTOR PLAN 1
Likely cardiac etiology, also + orthostatic vitals  - Admit to telemetry   - Troponin x 3 negative   - ECG: sinus jaida with 1st degree block   - f/u TTE read  - plan for monday ILR if patient agreeable to stay or dc if no event in 24 hours per discussion with Dr. Johnson  - Cardiology consult noted

## 2021-07-24 NOTE — PROGRESS NOTE ADULT - SUBJECTIVE AND OBJECTIVE BOX
[facility] Division of Hospital Medicine Progress Note    CONTACT INFO:  Fina Andrea M.D.  [phone]    Patient is a 81y old  Male who presents with a chief complaint of Syncope (2021 07:37)      SUBJECTIVE / OVERNIGHT EVENTS: Wants to go home and denies LOC during the episode earlier yesterday.     Patient denies chest pain, SOB, abd pain, N/V, fever, chills, dysuria or any other complaints. All remainder ROS negative.     MEDICATIONS  (STANDING):  aMIOdarone    Tablet 200 milliGRAM(s) Oral daily  aspirin enteric coated 81 milliGRAM(s) Oral daily  calcium carbonate 1250 mG  + Vitamin D (OsCal 500 + D) 1 Tablet(s) Oral two times a day  donepezil 10 milliGRAM(s) Oral <User Schedule>  enoxaparin Injectable 40 milliGRAM(s) SubCutaneous daily  memantine 10 milliGRAM(s) Oral daily  metoprolol succinate ER 50 milliGRAM(s) Oral daily  senna 2 Tablet(s) Oral at bedtime  simvastatin 40 milliGRAM(s) Oral at bedtime  tamsulosin 0.4 milliGRAM(s) Oral at bedtime    MEDICATIONS  (PRN):      I&O's Summary      PHYSICAL EXAM:  Vital Signs Last 24 Hrs  T(C): 36.6 (2021 11:17), Max: 36.7 (2021 21:58)  T(F): 97.9 (2021 11:17), Max: 98 (2021 21:58)  HR: 88 (2021 11:17) (52 - 88)  BP: 126/80 (2021 11:17) (111/73 - 145/70)  BP(mean): --  RR: 18 (2021 11:17) (18 - 18)  SpO2: 94% (2021 11:17) (94% - 98%)    General: Appears well developed, mildly anxious and repetitive at times  HEENT: Head; normocephalic, atraumatic. sclera anicteric, MMM, no JVD, neck is supple   CARDIOVASCULAR; Normal S1 and S2.  LUNGS; No rales, rhonchi or wheeze. Normal breath sounds bilaterally.  ABDOMEN ; Soft, nontender without mass or organomegaly. bowel sounds normoactive.  EXTREMITIES; No clubbing, cyanosis or edema. Distal pulses wnl. ROM normal.  SKIN; warm and dry with normal turgor.  NEURO; Alert/oriented x 3/normal motor exam.     PSYCH; normal affect.    LABS:                        13.4   7.75  )-----------( 263      ( 2021 07:17 )             40.3     07-24    136  |  102  |  21.4<H>  ----------------------------<  105<H>  4.1   |  23.0  |  1.04    Ca    8.8      2021 07:17    TPro  6.9  /  Alb  3.7  /  TBili  0.3<L>  /  DBili  x   /  AST  24  /  ALT  30  /  AlkPhos  101  07-23      CARDIAC MARKERS ( 2021 21:51 )  x     / <0.01 ng/mL / x     / x     / x      CARDIAC MARKERS ( 2021 19:09 )  x     / <0.01 ng/mL / x     / x     / x      CARDIAC MARKERS ( 2021 14:27 )  x     / <0.01 ng/mL / x     / x     / x          Urinalysis Basic - ( 2021 15:32 )    Color: Yellow / Appearance: Clear / S.020 / pH: x  Gluc: x / Ketone: Negative  / Bili: Negative / Urobili: Negative mg/dL   Blood: x / Protein: 15 mg/dL / Nitrite: Negative   Leuk Esterase: Trace / RBC: Negative /HPF / WBC 0-2   Sq Epi: x / Non Sq Epi: Negative / Bacteria: Negative        CAPILLARY BLOOD GLUCOSE          RADIOLOGY & ADDITIONAL TESTS:  Results Reviewed:   Imaging Personally Reviewed:  Electrocardiogram Personally Reviewed:

## 2021-07-24 NOTE — GOALS OF CARE CONVERSATION - ADVANCED CARE PLANNING - CONVERSATION DETAILS
Goals of care discussed with patient and his niece at bed side; patient named his son (Jerod) and sister (Zoila Dewitt) as his proxy saying they both have equal right in decision making as per patient. He has not had a concrete decision about DNR/DNI, will discussed it with his family. For now requesting to be full code.
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
## 2021-07-25 ENCOUNTER — TRANSCRIPTION ENCOUNTER (OUTPATIENT)
Age: 81
End: 2021-07-25

## 2021-07-25 VITALS
DIASTOLIC BLOOD PRESSURE: 75 MMHG | OXYGEN SATURATION: 94 % | RESPIRATION RATE: 20 BRPM | HEART RATE: 58 BPM | TEMPERATURE: 98 F | SYSTOLIC BLOOD PRESSURE: 129 MMHG

## 2021-07-25 PROCEDURE — 80053 COMPREHEN METABOLIC PANEL: CPT

## 2021-07-25 PROCEDURE — 99285 EMERGENCY DEPT VISIT HI MDM: CPT | Mod: 25

## 2021-07-25 PROCEDURE — 81001 URINALYSIS AUTO W/SCOPE: CPT

## 2021-07-25 PROCEDURE — 84484 ASSAY OF TROPONIN QUANT: CPT

## 2021-07-25 PROCEDURE — 80048 BASIC METABOLIC PNL TOTAL CA: CPT

## 2021-07-25 PROCEDURE — 86769 SARS-COV-2 COVID-19 ANTIBODY: CPT

## 2021-07-25 PROCEDURE — 97163 PT EVAL HIGH COMPLEX 45 MIN: CPT

## 2021-07-25 PROCEDURE — 71045 X-RAY EXAM CHEST 1 VIEW: CPT

## 2021-07-25 PROCEDURE — U0003: CPT

## 2021-07-25 PROCEDURE — 93005 ELECTROCARDIOGRAM TRACING: CPT

## 2021-07-25 PROCEDURE — G0378: CPT

## 2021-07-25 PROCEDURE — 99239 HOSP IP/OBS DSCHRG MGMT >30: CPT

## 2021-07-25 PROCEDURE — 87086 URINE CULTURE/COLONY COUNT: CPT

## 2021-07-25 PROCEDURE — 93306 TTE W/DOPPLER COMPLETE: CPT

## 2021-07-25 PROCEDURE — U0005: CPT

## 2021-07-25 PROCEDURE — 36415 COLL VENOUS BLD VENIPUNCTURE: CPT

## 2021-07-25 PROCEDURE — 70450 CT HEAD/BRAIN W/O DYE: CPT | Mod: MA

## 2021-07-25 PROCEDURE — 85025 COMPLETE CBC W/AUTO DIFF WBC: CPT

## 2021-07-25 PROCEDURE — 85027 COMPLETE CBC AUTOMATED: CPT

## 2021-07-25 RX ORDER — DONEPEZIL HYDROCHLORIDE 10 MG/1
1 TABLET, FILM COATED ORAL
Qty: 0 | Refills: 0 | DISCHARGE
Start: 2021-07-25

## 2021-07-25 RX ORDER — SODIUM CHLORIDE 9 MG/ML
1000 INJECTION INTRAMUSCULAR; INTRAVENOUS; SUBCUTANEOUS ONCE
Refills: 0 | Status: COMPLETED | OUTPATIENT
Start: 2021-07-25 | End: 2021-07-25

## 2021-07-25 RX ORDER — ASPIRIN/CALCIUM CARB/MAGNESIUM 324 MG
1 TABLET ORAL
Qty: 0 | Refills: 0 | DISCHARGE
Start: 2021-07-25

## 2021-07-25 RX ORDER — MEMANTINE HYDROCHLORIDE 10 MG/1
1 TABLET ORAL
Qty: 0 | Refills: 0 | DISCHARGE
Start: 2021-07-25

## 2021-07-25 RX ORDER — SERTRALINE 25 MG/1
1 TABLET, FILM COATED ORAL
Qty: 0 | Refills: 0 | DISCHARGE
Start: 2021-07-25

## 2021-07-25 RX ORDER — HALOPERIDOL DECANOATE 100 MG/ML
2.5 INJECTION INTRAMUSCULAR ONCE
Refills: 0 | Status: COMPLETED | OUTPATIENT
Start: 2021-07-25 | End: 2021-07-25

## 2021-07-25 RX ADMIN — SODIUM CHLORIDE 500 MILLILITER(S): 9 INJECTION INTRAMUSCULAR; INTRAVENOUS; SUBCUTANEOUS at 11:41

## 2021-07-25 RX ADMIN — Medication 50 MILLIGRAM(S): at 06:52

## 2021-07-25 RX ADMIN — MEMANTINE HYDROCHLORIDE 10 MILLIGRAM(S): 10 TABLET ORAL at 13:54

## 2021-07-25 RX ADMIN — Medication 81 MILLIGRAM(S): at 13:54

## 2021-07-25 RX ADMIN — AMIODARONE HYDROCHLORIDE 200 MILLIGRAM(S): 400 TABLET ORAL at 06:52

## 2021-07-25 RX ADMIN — SERTRALINE 50 MILLIGRAM(S): 25 TABLET, FILM COATED ORAL at 13:54

## 2021-07-25 RX ADMIN — Medication 1 TABLET(S): at 06:52

## 2021-07-25 RX ADMIN — ENOXAPARIN SODIUM 40 MILLIGRAM(S): 100 INJECTION SUBCUTANEOUS at 13:53

## 2021-07-25 RX ADMIN — HALOPERIDOL DECANOATE 2.5 MILLIGRAM(S): 100 INJECTION INTRAMUSCULAR at 01:54

## 2021-07-25 NOTE — PROGRESS NOTE ADULT - PROBLEM SELECTOR PLAN 7
DVT prophylaxis: Lovenox   Diet: DASH   Dispo: likely over the next couple of days, pending PT eval and social work eval    Code status: full     Updated on plan of care with patient at bedside. All questions and concerns addressed. Sister Zoila aware of plan. DVT prophylaxis: Lovenox   Diet: DASH   Dispo: likely over the next couple of days, pending PT eval and social work eval    Code status: full     Updated on plan of care with patient at bedside and sister Zoila on the phone. All questions and concerns addressed.

## 2021-07-25 NOTE — PROGRESS NOTE ADULT - PROBLEM SELECTOR PLAN 6
AAOx3, but repetitive at times, likely has underlying dementia  - c/w Aricept 10mg  - also on home Zoloft and Xanax PRN

## 2021-07-25 NOTE — DISCHARGE NOTE PROVIDER - CARE PROVIDER_API CALL
Jeffery Johnson)  Internal Medicine  [address] [place] [address]  Phone: [phone]  Fax: [phone]  Follow Up Time: 1 week

## 2021-07-25 NOTE — PROGRESS NOTE ADULT - PROBLEM SELECTOR PLAN 2
- Not on AC   - Rate controlled   - Amiodarone 200mg
- Not on AC   - Rate controlled   - Amiodarone 200mg

## 2021-07-25 NOTE — PROGRESS NOTE ADULT - ASSESSMENT
82 y/o male with PMH of afib, HTN, HLD, NSVT, CAD, dementia was brought to the ED via ambulance for syncope. Patient said he drove himself to deli, while he was on the line waiting to place his order, the staff asked him if he was ok because he did not look right for which he responded yes. Few minute later, he slumped forward on the deli counter. He was unsure if he lost consciousness but did mentioned he lost 6 sec of which he did not recall what happened. As per ED, EMS reported patient was confused on their arrival.   CT head: no acute intracranial finding, XR: normal. Patient initially placed in observation, cardiology consulted who recommended admit due to patient's cardiac hx; monitor on tele vs. possible ILR placement on Monday.     Also with gait instability, pending PT eval. 80 y/o male with PMH of afib, HTN, HLD, NSVT, CAD, dementia was brought to the ED via ambulance for syncope. Patient said he drove himself to deli, while he was on the line waiting to place his order, the staff asked him if he was ok because he did not look right for which he responded yes. Few minute later, he slumped forward on the deli counter. He was unsure if he lost consciousness but did mentioned he lost 6 sec of which he did not recall what happened. As per ED, EMS reported patient was confused on their arrival.   CT head: no acute intracranial finding, XR: normal. Patient initially placed in observation, cardiology consulted who recommended admit due to patient's cardiac hx; monitor on tele vs. possible ILR placement on Monday vs. outpatient ILR.     Also with gait instability Pt eval - no rehab needs

## 2021-07-25 NOTE — PROGRESS NOTE ADULT - PROBLEM SELECTOR PLAN 1
Likely cardiac etiology, also + orthostatic vitals  - Admit to telemetry   - Troponin x 3 negative   - ECG: sinus jaida with 1st degree block   - TTE: Left ventricular ejection fraction, by visual estimation, is 55 to 60%. Grade II diastolic dysfunction.  - plan for monday ILR if PT not discharged, NPO Sunday night  - Cardiology consult noted Likely cardiac etiology, also + orthostatic vitals  - Admit to telemetry   - Troponin x 3 negative   - ECG: sinus jaida with 1st degree block   - TTE: Left ventricular ejection fraction, by visual estimation, is 55 to 60%. Grade II diastolic dysfunction.  - plan for monday ILR if PT not discharged, NPO Sunday night  - Cardiology consult noted  - +orthostatic on 7/25, will bolus 1L
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
## 2021-07-25 NOTE — PHYSICAL THERAPY INITIAL EVALUATION ADULT - GENERAL OBSERVATIONS, REHAB EVAL
Pt received in sitting at edge of stretcher, + IV Loc, +Tele, breathing on RA in NAD, in 0/10 pain, agreeable to PT evaluation

## 2021-07-25 NOTE — DISCHARGE NOTE NURSING/CASE MANAGEMENT/SOCIAL WORK - NSDCFUADDAPPT_GEN_ALL_CORE_FT
Cardiology office will call you to schedule an appointment for loop recorder. If you do not hear with within the next few days, please call [phone] to make an appointment yourself.

## 2021-07-25 NOTE — PROGRESS NOTE ADULT - SUBJECTIVE AND OBJECTIVE BOX
[facility] Division of Hospital Medicine Progress Note    CONTACT INFO:  Fina Andrea M.D.  [phone]    Patient is a 81y old  Male who presents with a chief complaint of Syncope (2021 09:53)      SUBJECTIVE / OVERNIGHT EVENTS: no acute complaints. Reports feeling confused and unable to recall why he is still in the hospital. Lives at home by himself. Per RN, +orthostatic this AM    Patient denies chest pain, SOB, abd pain, N/V, fever, chills, dysuria or any other complaints. All remainder ROS negative.     MEDICATIONS  (STANDING):  aMIOdarone    Tablet 200 milliGRAM(s) Oral daily  aspirin enteric coated 81 milliGRAM(s) Oral daily  calcium carbonate 1250 mG  + Vitamin D (OsCal 500 + D) 1 Tablet(s) Oral two times a day  donepezil 10 milliGRAM(s) Oral <User Schedule>  enoxaparin Injectable 40 milliGRAM(s) SubCutaneous daily  memantine 10 milliGRAM(s) Oral daily  metoprolol succinate ER 50 milliGRAM(s) Oral daily  senna 2 Tablet(s) Oral at bedtime  sertraline 50 milliGRAM(s) Oral daily  simvastatin 40 milliGRAM(s) Oral at bedtime  tamsulosin 0.4 milliGRAM(s) Oral at bedtime    MEDICATIONS  (PRN):  ALPRAZolam 0.5 milliGRAM(s) Oral three times a day PRN agitation/anxiety      I&O's Summary      PHYSICAL EXAM:  Vital Signs Last 24 Hrs  T(C): 36.2 (2021 11:26), Max: 37 (2021 19:24)  T(F): 97.1 (2021 11:26), Max: 98.6 (2021 19:24)  HR: 62 (2021 11:26) (57 - 68)  BP: 99/63 (2021 11:26) (99/63 - 160/81)  BP(mean): --  RR: 18 (2021 11:26) (18 - 20)  SpO2: 97% (2021 11:26) (95% - 100%)    General: Appears well developed, confused but pleasant  HEENT: Head; normocephalic, atraumatic. sclera anicteric, MMM, no JVD, neck is supple   CARDIOVASCULAR; Normal S1 and S2.  LUNGS; No rales, rhonchi or wheeze. Normal breath sounds bilaterally.  ABDOMEN ; Soft, nontender without mass or organomegaly. bowel sounds normoactive.  EXTREMITIES; No clubbing, cyanosis or edema. Distal pulses wnl. ROM normal.  SKIN; warm and dry with normal turgor.  NEURO; normal motor exam, non focal  PSYCH; normal affect.  LABS:                        13.4   7.75  )-----------( 263      ( 2021 07:17 )             40.3     07-    136  |  102  |  21.4<H>  ----------------------------<  105<H>  4.1   |  23.0  |  1.04    Ca    8.8      2021 07:17    TPro  6.9  /  Alb  3.7  /  TBili  0.3<L>  /  DBili  x   /  AST  24  /  ALT  30  /  AlkPhos  101  07-23      CARDIAC MARKERS ( 2021 21:51 )  x     / <0.01 ng/mL / x     / x     / x      CARDIAC MARKERS ( 2021 19:09 )  x     / <0.01 ng/mL / x     / x     / x      CARDIAC MARKERS ( 2021 14:27 )  x     / <0.01 ng/mL / x     / x     / x          Urinalysis Basic - ( 2021 15:32 )    Color: Yellow / Appearance: Clear / S.020 / pH: x  Gluc: x / Ketone: Negative  / Bili: Negative / Urobili: Negative mg/dL   Blood: x / Protein: 15 mg/dL / Nitrite: Negative   Leuk Esterase: Trace / RBC: Negative /HPF / WBC 0-2   Sq Epi: x / Non Sq Epi: Negative / Bacteria: Negative        CAPILLARY BLOOD GLUCOSE          RADIOLOGY & ADDITIONAL TESTS:  Results Reviewed:   Imaging Personally Reviewed:  Electrocardiogram Personally Reviewed:

## 2021-07-25 NOTE — DISCHARGE NOTE PROVIDER - HOSPITAL COURSE
82 y/o male with PMH of afib, HTN, HLD, NSVT, CAD, dementia was brought to the ED via ambulance for syncope. Patient said he drove himself to deli, while he was on the line waiting to place his order, the staff asked him if he was ok because he did not look right for which he responded yes. Few minute later, he slumped forward on the deli counter. He was unsure if he lost consciousness but did mentioned he lost 6 sec of which he did not recall what happened. As per ED, EMS reported patient was confused on their arrival.   CT head: no acute intracranial finding, XR: normal. Patient initially placed in observation, cardiology consulted who rec tele monitoring. No event on tele for > 24hours, was discharged home with plan for outpatient ILR placement.      Also with gait instability, likely due to dehydration, + orthostatic vitla. Received fluids and ataxia improved. Per PT, no need for rehab.    Spoke with sister Zoila - patient has underlying dementia but able to function independently at home. She will stay with the patient for a few days until ILR placement is done.     D/w Social work and cardiology, stable for discharge with close outpatient followup.     Vital Signs Last 24 Hrs  T(C): 36.2 (25 Jul 2021 11:26), Max: 37 (24 Jul 2021 19:24)  T(F): 97.1 (25 Jul 2021 11:26), Max: 98.6 (24 Jul 2021 19:24)  HR: 62 (25 Jul 2021 11:26) (57 - 68)  BP: 99/63 (25 Jul 2021 11:26) (99/63 - 160/81)  BP(mean): --  RR: 18 (25 Jul 2021 11:26) (18 - 20)  SpO2: 97% (25 Jul 2021 11:26) (95% - 100%)    General: Appears well developed, confused but pleasant  HEENT: Head; normocephalic, atraumatic. sclera anicteric, MMM, no JVD, neck is supple   CARDIOVASCULAR; Normal S1 and S2.  LUNGS; No rales, rhonchi or wheeze. Normal breath sounds bilaterally.  ABDOMEN ; Soft, nontender without mass or organomegaly. bowel sounds normoactive.  EXTREMITIES; No clubbing, cyanosis or edema. Distal pulses wnl. ROM normal.  SKIN; warm and dry with normal turgor.  NEURO; normal motor exam, non focal  PSYCH; normal affect.    I spent 35 minutes in patient encounter, greater than 50% of the time was spent in counseling/coordination of care/discharge planning. 82 y/o male with PMH of afib, HTN, HLD, NSVT, CAD, dementia was brought to the ED via ambulance for syncope. Patient said he drove himself to deli, while he was on the line waiting to place his order, the staff asked him if he was ok because he did not look right for which he responded yes. Few minute later, he slumped forward on the deli counter. He was unsure if he lost consciousness but did mentioned he lost 6 sec of which he did not recall what happened. As per ED, EMS reported patient was confused on their arrival.   CT head: no acute intracranial finding, XR: normal. Patient initially placed in observation, cardiology consulted who rec tele monitoring. No event on tele for > 24hours, was discharged home with plan for outpatient ILR placement.      Also with gait instability, likely due to dehydration, + orthostatic vitla. Received fluids and ataxia improved. Per PT, no need for rehab.    Spoke with sister Zoila - patient has underlying dementia but able to function independently at home. She will stay with the patient for a few days until ILR placement is done.     D/w Social work and cardiology, stable for discharge with close outpatient followup.     Vital Signs Last 24 Hrs  T(C): 36.2 (25 Jul 2021 11:26), Max: 37 (24 Jul 2021 19:24)  T(F): 97.1 (25 Jul 2021 11:26), Max: 98.6 (24 Jul 2021 19:24)  HR: 62 (25 Jul 2021 11:26) (57 - 68)  BP: 99/63 (25 Jul 2021 11:26) (99/63 - 160/81)  BP(mean): --  RR: 18 (25 Jul 2021 11:26) (18 - 20)  SpO2: 97% (25 Jul 2021 11:26) (95% - 100%)    General: Appears well developed, confused but pleasant  HEENT: Head; normocephalic, atraumatic. sclera anicteric, MMM, no JVD, neck is supple, dry mouth  CARDIOVASCULAR; Normal S1 and S2.  LUNGS; No rales, rhonchi or wheeze. Normal breath sounds bilaterally.  ABDOMEN ; Soft, nontender without mass or organomegaly. bowel sounds normoactive.  EXTREMITIES; No clubbing, cyanosis or edema. Distal pulses wnl. ROM normal.  SKIN; warm and dry with normal turgor.  NEURO; normal motor exam, non focal  PSYCH; normal affect.    I spent 35 minutes in patient encounter, greater than 50% of the time was spent in counseling/coordination of care/discharge planning.

## 2021-07-25 NOTE — PHYSICAL THERAPY INITIAL EVALUATION ADULT - PERTINENT HX OF CURRENT PROBLEM, REHAB EVAL
brought to the ED via ambulance for syncope. Patient said he drove himself to deli, while he was on the line waiting to place his order, the staff asked him if he was ok because he did not look right for which he responded yes. Few minute later, he slumped forward on the deli counter. CT head: no acute intracranial finding, XR: normal. possible ILR placement on Monday vs. outpatient ILR.

## 2021-07-25 NOTE — PHYSICAL THERAPY INITIAL EVALUATION ADULT - ADDITIONAL COMMENTS
Pt reports living in private home, alone, with no steps to enter or inside. Pt independent prior to admission. Owns RW/SAC/shower chair, but does not use. Pt drives & is retired. Spoke with pt's sister Patricia, she reports herself and her 3 daughters check on pt frequently throughout the day.

## 2021-07-25 NOTE — DISCHARGE NOTE PROVIDER - NSDCMRMEDTOKEN_GEN_ALL_CORE_FT
acetaminophen-oxyCODONE 325 mg-5 mg oral tablet: 1-2 tab(s) orally every 4-6 hours, As needed for pain.  amiodarone 200 mg oral tablet: 1 tab(s) orally once a day  calcium-vitamin D 200 mg-250 intl units oral tablet: 2 tab(s) orally 2 times a day  Cipro 500 mg oral tablet: 1 tab(s) orally every 12 hours   Co Q-10 100 mg oral capsule: 1 cap(s) orally once a day  docusate sodium 100 mg oral capsule: 1 cap(s) orally 3 times a day  Flomax 0.4 mg oral capsule: 1 cap(s) orally once a day   Fosamax 70 mg oral tablet: 1 tab(s) orally once a week  oxybutynin 5 mg oral tablet: 1 tab(s) orally every 8 hours as needed for bladder spasms  senna oral tablet: 2 tab(s) orally once a day (at bedtime)  Toprol-XL 50 mg oral tablet, extended release: 1 tab(s) orally once a day  Xanax 0.25 mg oral tablet:  orally 2 times a day  Zocor 20 mg oral tablet: 1 tab(s) orally once a day (at bedtime)   amiodarone 200 mg oral tablet: 1 tab(s) orally once a day  aspirin 81 mg oral delayed release tablet: 1 tab(s) orally once a day  calcium-vitamin D 200 mg-250 intl units oral tablet: 2 tab(s) orally 2 times a day  Co Q-10 100 mg oral capsule: 1 cap(s) orally once a day  docusate sodium 100 mg oral capsule: 1 cap(s) orally 3 times a day  donepezil 10 mg oral tablet: 1 tab(s) orally   Flomax 0.4 mg oral capsule: 1 cap(s) orally once a day   Fosamax 70 mg oral tablet: 1 tab(s) orally once a week  memantine 10 mg oral tablet: 1 tab(s) orally once a day  senna oral tablet: 2 tab(s) orally once a day (at bedtime)  sertraline 50 mg oral tablet: 1 tab(s) orally once a day  Toprol-XL 50 mg oral tablet, extended release: 1 tab(s) orally once a day  Xanax 0.25 mg oral tablet: orally 2 times a day, As Needed  Zocor 20 mg oral tablet: 1 tab(s) orally once a day (at bedtime)

## 2021-07-25 NOTE — DISCHARGE NOTE PROVIDER - PROVIDER RX CONTACT NUMBER
Refill request for Premarin vaginal cream, # 90 with refills.  LV:6-15-16  Patient has not made appointment for follow up, but aware to schedule.  Standing orders.  Last pap:12-08-15   [phone]

## 2021-07-25 NOTE — DISCHARGE NOTE NURSING/CASE MANAGEMENT/SOCIAL WORK - PATIENT PORTAL LINK FT
You can access the FollowMyHealth Patient Portal offered by [facility] by registering at the following website: [email or web address]. By joining [employer]’s FollowMyHealth portal, you will also be able to view your health information using other applications (apps) compatible with our system.

## 2021-07-25 NOTE — DISCHARGE NOTE PROVIDER - NSDCCPTREATMENT_GEN_ALL_CORE_FT
PRINCIPAL PROCEDURE  Procedure: Transthoracic echo  Findings and Treatment:   < end of copied text >  Summary:   1. Normal left atrial size.   2. Normal wall motion. Left ventricular ejection fraction, by visual estimation, is 55 to 60%. Grade II diastolic dysfunction.   3. Normal right atrial size.   4. Normal right ventricular size and function.   5. No significant valvular abnormality.   6. There is no evidence of pericardial effusion.< from: TTE Echo Complete w/o Contrast w/ Doppler (07.23.21 @ 19:56) >

## 2021-07-25 NOTE — DISCHARGE NOTE PROVIDER - NSDCFUADDAPPT_GEN_ALL_CORE_FT
Cardiology office will call you to schedule an appointment for loop recorder. If you do not hear with within the next few days, please call [phone] to make an appointment yourself.

## 2021-07-26 LAB
CULTURE RESULTS: SIGNIFICANT CHANGE UP
SPECIMEN SOURCE: SIGNIFICANT CHANGE UP

## 2021-07-30 ENCOUNTER — APPOINTMENT (OUTPATIENT)
Dept: UROLOGY | Facility: CLINIC | Age: 81
End: 2021-07-30

## 2021-11-23 NOTE — ED ADULT NURSE NOTE - SUICIDE SCREENING QUESTION 3
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 He is admitted for diabetic foot infection, started on abc and podiatry team consulted.  He has known h/o DM type 2, a1c 12.9% on admission.  He reports being previously on metformin which he self d/c’ed due to adverse GI effects.  He is very reluctant to starting insulin or any other injectable diabetic meds.      Uncontrolled DM with hyperglycemia, a1c 12.9% on admission  -  - continue  Lantus 15 qhs, once starts eating smart admelog 4 units tid with meals and SSI.  - diabetic teaching  - Ha detail discussion with patient about risks of uncontrolled diabetes and sig. of taking insulin,  -Fingerstick ac tid hs.  -Diabetic diet.    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 He is admitted for diabetic foot infection, started on abc and podiatry team consulted.  He has known h/o DM type 2, a1c 12.9% on admission.  He reports being previously on metformin which he self d/c’ed due to adverse GI effects.  He is very reluctant to starting insulin or any other injectable diabetic meds.      Uncontrolled DM with hyperglycemia, a1c 12.9% on admission  - continue  Lantus 15 qhs, once starts eating smart admelog 4 units tid with meals and SSI.  - diabetic teaching  - Ha detail discussion with patient about risks of uncontrolled diabetes and sig. of taking insulin,  -Fingerstick ac tid hs.  -Diabetic diet.    Diabetic foot infection, osteomyelitis   -c/w  vancomycin and aztreonam   - follow  ID recs  -follow Vascular recs   -Pain control   -wound care as per podiatry team    Leukocytosis  -Due to  infection  -Follow  CBC    Hyponatremia, resolved  -Likely hypovolemic  -follow BMP     No

## 2022-05-05 NOTE — ED ADULT NURSE NOTE - CAS EDN DISCHARGE ASSESSMENT
Alert and oriented to person, place and time Consent (Lip)/Introductory Paragraph: The rationale for Mohs was explained to the patient and consent was obtained. The risks, benefits and alternatives to therapy were discussed in detail. Specifically, the risks of lip deformity, changes in the oral aperture, infection, scarring, bleeding, prolonged wound healing, incomplete removal, allergy to anesthesia, nerve injury and recurrence were addressed. Prior to the procedure, the treatment site was clearly identified and confirmed by the patient. All components of Universal Protocol/PAUSE Rule completed.

## 2022-06-05 ENCOUNTER — INPATIENT (INPATIENT)
Facility: HOSPITAL | Age: 82
LOS: 3 days | Discharge: NURSING, REHAB OR LONG-TERM CARE | DRG: 184 | End: 2022-06-09
Attending: SURGERY | Admitting: SURGERY
Payer: MEDICARE

## 2022-06-05 VITALS
WEIGHT: 195.11 LBS | HEIGHT: 70 IN | RESPIRATION RATE: 18 BRPM | SYSTOLIC BLOOD PRESSURE: 128 MMHG | OXYGEN SATURATION: 97 % | HEART RATE: 62 BPM | DIASTOLIC BLOOD PRESSURE: 78 MMHG | TEMPERATURE: 98 F

## 2022-06-05 DIAGNOSIS — Z98.89 OTHER SPECIFIED POSTPROCEDURAL STATES: Chronic | ICD-10-CM

## 2022-06-05 DIAGNOSIS — S22.42XA MULTIPLE FRACTURES OF RIBS, LEFT SIDE, INITIAL ENCOUNTER FOR CLOSED FRACTURE: ICD-10-CM

## 2022-06-05 LAB
ALBUMIN SERPL ELPH-MCNC: 3.8 G/DL — SIGNIFICANT CHANGE UP (ref 3.3–5.2)
ALP SERPL-CCNC: 117 U/L — SIGNIFICANT CHANGE UP (ref 40–120)
ALT FLD-CCNC: 17 U/L — SIGNIFICANT CHANGE UP
ANION GAP SERPL CALC-SCNC: 11 MMOL/L — SIGNIFICANT CHANGE UP (ref 5–17)
APTT BLD: 36.9 SEC — HIGH (ref 27.5–35.5)
AST SERPL-CCNC: 19 U/L — SIGNIFICANT CHANGE UP
BASE EXCESS BLDA CALC-SCNC: 2.7 MMOL/L — SIGNIFICANT CHANGE UP (ref -2–3)
BASOPHILS # BLD AUTO: 0.03 K/UL — SIGNIFICANT CHANGE UP (ref 0–0.2)
BASOPHILS NFR BLD AUTO: 0.3 % — SIGNIFICANT CHANGE UP (ref 0–2)
BILIRUB SERPL-MCNC: 0.5 MG/DL — SIGNIFICANT CHANGE UP (ref 0.4–2)
BLD GP AB SCN SERPL QL: SIGNIFICANT CHANGE UP
BLOOD GAS COMMENTS ARTERIAL: SIGNIFICANT CHANGE UP
BUN SERPL-MCNC: 21.2 MG/DL — HIGH (ref 8–20)
CALCIUM SERPL-MCNC: 8.6 MG/DL — SIGNIFICANT CHANGE UP (ref 8.6–10.2)
CHLORIDE SERPL-SCNC: 101 MMOL/L — SIGNIFICANT CHANGE UP (ref 98–107)
CO2 SERPL-SCNC: 24 MMOL/L — SIGNIFICANT CHANGE UP (ref 22–29)
CREAT SERPL-MCNC: 0.81 MG/DL — SIGNIFICANT CHANGE UP (ref 0.5–1.3)
EGFR: 88 ML/MIN/1.73M2 — SIGNIFICANT CHANGE UP
EOSINOPHIL # BLD AUTO: 0.21 K/UL — SIGNIFICANT CHANGE UP (ref 0–0.5)
EOSINOPHIL NFR BLD AUTO: 2.1 % — SIGNIFICANT CHANGE UP (ref 0–6)
ETHANOL SERPL-MCNC: <10 MG/DL — SIGNIFICANT CHANGE UP (ref 0–9)
GAS PNL BLDA: SIGNIFICANT CHANGE UP
GLUCOSE SERPL-MCNC: 119 MG/DL — HIGH (ref 70–99)
HCO3 BLDA-SCNC: 27 MMOL/L — SIGNIFICANT CHANGE UP (ref 21–28)
HCT VFR BLD CALC: 40.1 % — SIGNIFICANT CHANGE UP (ref 39–50)
HGB BLD-MCNC: 13.4 G/DL — SIGNIFICANT CHANGE UP (ref 13–17)
HOROWITZ INDEX BLDA+IHG-RTO: 21 — SIGNIFICANT CHANGE UP
IMM GRANULOCYTES NFR BLD AUTO: 0.6 % — SIGNIFICANT CHANGE UP (ref 0–1.5)
INR BLD: 1.17 RATIO — HIGH (ref 0.88–1.16)
LIDOCAIN IGE QN: 60 U/L — HIGH (ref 22–51)
LYMPHOCYTES # BLD AUTO: 1.52 K/UL — SIGNIFICANT CHANGE UP (ref 1–3.3)
LYMPHOCYTES # BLD AUTO: 15.1 % — SIGNIFICANT CHANGE UP (ref 13–44)
MCHC RBC-ENTMCNC: 31.8 PG — SIGNIFICANT CHANGE UP (ref 27–34)
MCHC RBC-ENTMCNC: 33.4 GM/DL — SIGNIFICANT CHANGE UP (ref 32–36)
MCV RBC AUTO: 95 FL — SIGNIFICANT CHANGE UP (ref 80–100)
MONOCYTES # BLD AUTO: 1.05 K/UL — HIGH (ref 0–0.9)
MONOCYTES NFR BLD AUTO: 10.5 % — SIGNIFICANT CHANGE UP (ref 2–14)
NEUTROPHILS # BLD AUTO: 7.17 K/UL — SIGNIFICANT CHANGE UP (ref 1.8–7.4)
NEUTROPHILS NFR BLD AUTO: 71.4 % — SIGNIFICANT CHANGE UP (ref 43–77)
PCO2 BLDA: 43 MMHG — SIGNIFICANT CHANGE UP (ref 35–48)
PH BLDA: 7.41 — SIGNIFICANT CHANGE UP (ref 7.35–7.45)
PLATELET # BLD AUTO: 160 K/UL — SIGNIFICANT CHANGE UP (ref 150–400)
PO2 BLDA: 70 MMHG — LOW (ref 83–108)
POTASSIUM SERPL-MCNC: 4.2 MMOL/L — SIGNIFICANT CHANGE UP (ref 3.5–5.3)
POTASSIUM SERPL-SCNC: 4.2 MMOL/L — SIGNIFICANT CHANGE UP (ref 3.5–5.3)
PROT SERPL-MCNC: 6.8 G/DL — SIGNIFICANT CHANGE UP (ref 6.6–8.7)
PROTHROM AB SERPL-ACNC: 13.6 SEC — HIGH (ref 10.5–13.4)
RAPID RVP RESULT: SIGNIFICANT CHANGE UP
RBC # BLD: 4.22 M/UL — SIGNIFICANT CHANGE UP (ref 4.2–5.8)
RBC # FLD: 13.2 % — SIGNIFICANT CHANGE UP (ref 10.3–14.5)
SAO2 % BLDA: 96.4 % — SIGNIFICANT CHANGE UP (ref 94–98)
SARS-COV-2 RNA SPEC QL NAA+PROBE: SIGNIFICANT CHANGE UP
SODIUM SERPL-SCNC: 136 MMOL/L — SIGNIFICANT CHANGE UP (ref 135–145)
TROPONIN T SERPL-MCNC: <0.01 NG/ML — SIGNIFICANT CHANGE UP (ref 0–0.06)
WBC # BLD: 10.04 K/UL — SIGNIFICANT CHANGE UP (ref 3.8–10.5)
WBC # FLD AUTO: 10.04 K/UL — SIGNIFICANT CHANGE UP (ref 3.8–10.5)

## 2022-06-05 PROCEDURE — 93010 ELECTROCARDIOGRAM REPORT: CPT

## 2022-06-05 PROCEDURE — 99285 EMERGENCY DEPT VISIT HI MDM: CPT | Mod: GC

## 2022-06-05 PROCEDURE — 74176 CT ABD & PELVIS W/O CONTRAST: CPT | Mod: 26,MA

## 2022-06-05 PROCEDURE — 71250 CT THORAX DX C-: CPT | Mod: 26,MA

## 2022-06-05 PROCEDURE — 70450 CT HEAD/BRAIN W/O DYE: CPT | Mod: 26,MA

## 2022-06-05 PROCEDURE — 71045 X-RAY EXAM CHEST 1 VIEW: CPT | Mod: 26

## 2022-06-05 PROCEDURE — 72125 CT NECK SPINE W/O DYE: CPT | Mod: 26,MA

## 2022-06-05 PROCEDURE — 99222 1ST HOSP IP/OBS MODERATE 55: CPT | Mod: GC

## 2022-06-05 RX ORDER — MORPHINE SULFATE 50 MG/1
2 CAPSULE, EXTENDED RELEASE ORAL ONCE
Refills: 0 | Status: DISCONTINUED | OUTPATIENT
Start: 2022-06-05 | End: 2022-06-05

## 2022-06-05 RX ORDER — TAMSULOSIN HYDROCHLORIDE 0.4 MG/1
0.4 CAPSULE ORAL AT BEDTIME
Refills: 0 | Status: DISCONTINUED | OUTPATIENT
Start: 2022-06-05 | End: 2022-06-09

## 2022-06-05 RX ORDER — OXYCODONE HYDROCHLORIDE 5 MG/1
2.5 TABLET ORAL EVERY 6 HOURS
Refills: 0 | Status: DISCONTINUED | OUTPATIENT
Start: 2022-06-05 | End: 2022-06-08

## 2022-06-05 RX ORDER — AMIODARONE HYDROCHLORIDE 400 MG/1
200 TABLET ORAL DAILY
Refills: 0 | Status: DISCONTINUED | OUTPATIENT
Start: 2022-06-05 | End: 2022-06-09

## 2022-06-05 RX ORDER — DONEPEZIL HYDROCHLORIDE 10 MG/1
5 TABLET, FILM COATED ORAL AT BEDTIME
Refills: 0 | Status: DISCONTINUED | OUTPATIENT
Start: 2022-06-05 | End: 2022-06-09

## 2022-06-05 RX ORDER — GABAPENTIN 400 MG/1
100 CAPSULE ORAL EVERY 8 HOURS
Refills: 0 | Status: DISCONTINUED | OUTPATIENT
Start: 2022-06-05 | End: 2022-06-09

## 2022-06-05 RX ORDER — KETOROLAC TROMETHAMINE 30 MG/ML
15 SYRINGE (ML) INJECTION EVERY 6 HOURS
Refills: 0 | Status: DISCONTINUED | OUTPATIENT
Start: 2022-06-05 | End: 2022-06-09

## 2022-06-05 RX ORDER — OXYCODONE HYDROCHLORIDE 5 MG/1
5 TABLET ORAL EVERY 6 HOURS
Refills: 0 | Status: DISCONTINUED | OUTPATIENT
Start: 2022-06-05 | End: 2022-06-08

## 2022-06-05 RX ORDER — ACETAMINOPHEN 500 MG
975 TABLET ORAL EVERY 6 HOURS
Refills: 0 | Status: COMPLETED | OUTPATIENT
Start: 2022-06-05 | End: 2022-06-07

## 2022-06-05 RX ORDER — LIDOCAINE 4 G/100G
1 CREAM TOPICAL DAILY
Refills: 0 | Status: DISCONTINUED | OUTPATIENT
Start: 2022-06-05 | End: 2022-06-09

## 2022-06-05 RX ORDER — SERTRALINE 25 MG/1
50 TABLET, FILM COATED ORAL DAILY
Refills: 0 | Status: DISCONTINUED | OUTPATIENT
Start: 2022-06-05 | End: 2022-06-09

## 2022-06-05 RX ORDER — METOPROLOL TARTRATE 50 MG
25 TABLET ORAL EVERY 12 HOURS
Refills: 0 | Status: DISCONTINUED | OUTPATIENT
Start: 2022-06-05 | End: 2022-06-09

## 2022-06-05 RX ORDER — HEPARIN SODIUM 5000 [USP'U]/ML
5000 INJECTION INTRAVENOUS; SUBCUTANEOUS EVERY 8 HOURS
Refills: 0 | Status: DISCONTINUED | OUTPATIENT
Start: 2022-06-05 | End: 2022-06-09

## 2022-06-05 RX ORDER — SENNA PLUS 8.6 MG/1
2 TABLET ORAL AT BEDTIME
Refills: 0 | Status: DISCONTINUED | OUTPATIENT
Start: 2022-06-05 | End: 2022-06-09

## 2022-06-05 RX ORDER — CHLORHEXIDINE GLUCONATE 213 G/1000ML
1 SOLUTION TOPICAL DAILY
Refills: 0 | Status: DISCONTINUED | OUTPATIENT
Start: 2022-06-05 | End: 2022-06-06

## 2022-06-05 RX ORDER — SODIUM CHLORIDE 9 MG/ML
250 INJECTION INTRAMUSCULAR; INTRAVENOUS; SUBCUTANEOUS ONCE
Refills: 0 | Status: COMPLETED | OUTPATIENT
Start: 2022-06-05 | End: 2022-06-05

## 2022-06-05 RX ADMIN — SODIUM CHLORIDE 250 MILLILITER(S): 9 INJECTION INTRAMUSCULAR; INTRAVENOUS; SUBCUTANEOUS at 15:01

## 2022-06-05 RX ADMIN — Medication 975 MILLIGRAM(S): at 18:16

## 2022-06-05 RX ADMIN — SODIUM CHLORIDE 250 MILLILITER(S): 9 INJECTION INTRAMUSCULAR; INTRAVENOUS; SUBCUTANEOUS at 17:46

## 2022-06-05 RX ADMIN — LIDOCAINE 1 PATCH: 4 CREAM TOPICAL at 20:21

## 2022-06-05 RX ADMIN — Medication 15 MILLIGRAM(S): at 18:16

## 2022-06-05 RX ADMIN — TAMSULOSIN HYDROCHLORIDE 0.4 MILLIGRAM(S): 0.4 CAPSULE ORAL at 22:46

## 2022-06-05 RX ADMIN — LIDOCAINE 1 PATCH: 4 CREAM TOPICAL at 17:38

## 2022-06-05 RX ADMIN — Medication 975 MILLIGRAM(S): at 17:38

## 2022-06-05 RX ADMIN — Medication 975 MILLIGRAM(S): at 23:07

## 2022-06-05 RX ADMIN — GABAPENTIN 100 MILLIGRAM(S): 400 CAPSULE ORAL at 22:46

## 2022-06-05 RX ADMIN — HEPARIN SODIUM 5000 UNIT(S): 5000 INJECTION INTRAVENOUS; SUBCUTANEOUS at 22:47

## 2022-06-05 RX ADMIN — SENNA PLUS 2 TABLET(S): 8.6 TABLET ORAL at 22:45

## 2022-06-05 RX ADMIN — Medication 15 MILLIGRAM(S): at 17:49

## 2022-06-05 RX ADMIN — DONEPEZIL HYDROCHLORIDE 5 MILLIGRAM(S): 10 TABLET, FILM COATED ORAL at 22:47

## 2022-06-05 NOTE — ED PROVIDER NOTE - PROGRESS NOTE DETAILS
Addended by: Idania Alves on: 12/2/2020 10:52 AM     Modules accepted: Orders : patient seen by surgery, admit to SICU. request ABG.

## 2022-06-05 NOTE — ED ADULT NURSE NOTE - OBJECTIVE STATEMENT
pt BIBA from home with reports of mechanical fall, denies use of thinners, no loc. pt reporting back pain. no abd pain, no chest pain. even and unlabored resps present, offers no complaints of headache.

## 2022-06-05 NOTE — ED ADULT NURSE NOTE - NSIMPLEMENTINTERV_GEN_ALL_ED
Implemented All Universal Safety Interventions:  [place] to call system. Call bell, personal items and telephone within reach. Instruct patient to call for assistance. Room bathroom lighting operational. Non-slip footwear when patient is off stretcher. Physically safe environment: no spills, clutter or unnecessary equipment. Stretcher in lowest position, wheels locked, appropriate side rails in place.

## 2022-06-05 NOTE — ED ADULT NURSE NOTE - ED STAT RN HANDOFF DETAILS
Report to ANTHONY Larson and report called to ICU ANTHONY Antoine.  Pt awaiting transport to bed 3103.

## 2022-06-05 NOTE — ED PROVIDER NOTE - PHYSICAL EXAMINATION
Constitutional: Uncomfortable appearing, awake, alert, oriented to person, place, and time/situation and in no apparent distress  ENMT: Airway patent nasal mucosa clear. Mouth with normal mucosa. Throat has no vesicles no oropharyngeal exudates and uvula is midline. No blood in the oropharynx  EYES: clear bilaterally, pupils equal, round and reactive to light  Cardiac: Regular rate, regular rhythm. Heart sounds S1, S2. No murmurs, rubs or gallops. Good capillary refill, 2+ pulses, no peripheral edema. L chest wall ttp  Respiratory: Lungs CTAB, no use of accessory muscles, no crackles, satting 99% on RA in no distress  Gastrointestinal: Abdomen nondistended, non-tender, no rebound guarding or peritoneal signs  Genitourinary: No CVA tenderness, pelvis nontender, bladder nondistended  Musculoskeletal: Spine appears normal, range of motion is not limited, midline cervical ttp, no stepoffs or obvious deformities, no paraspinal ttp   Neurological: Alert and oriented, no focal deficits, no motor or sensory deficits. CN 2-12 intact, PERRLA, EOMI, No FND, moving all extremities equally, sensation intact, No dysmetria, no ataxia, negative heel-shin, 5/5 strength   Skin: Skin normal color for race, warm, dry and intact, No evidence of rash

## 2022-06-05 NOTE — ED ADULT TRIAGE NOTE - ARRIVAL FROM
Home Refill for Lorazepam 1mg @hs called into patients [facility][id number] no refills. OK per Dr. Benavidez

## 2022-06-05 NOTE — ED ADULT NURSE REASSESSMENT NOTE - NS ED NURSE REASSESS COMMENT FT1
Pt admitted to SICU moved from main ED to critical care area. Arrives sleeping.  Arousable to light tactile stimulation.  Sister at bedside. pt has Hx of dementia, is alert to person, place, year and situation.  S/P fall from steps yesterday.  Multiple Left sided rib fractures.  Pt states pain is 4/10 when his is awake and moving.  Cardiac monitor showing sinus bradycardia.  oxygen saturation 98% on room air. Respirations even and unlabored.  Moves both legs without difficulty.  Pain in left side of chest when he moves his arms. Side rails up.  Awaiting inpatient bed.

## 2022-06-05 NOTE — ED PROVIDER NOTE - ATTENDING CONTRIBUTION TO CARE
I, Yannick Lawrence, personally saw the patient with the resident, and completed the key components of the history and physical exam. I then discussed the management plan with the resident.    81 yo M hx of afib (not sure on AC anymore), HTN, HLD, NSVT, CAD p/w mechanical trip and fall yesterday around 5 pm on the step. no loc. Patient have diffuse left lower chest pain. CT showed multiple fracture on the left side. patient seen by surgery. admit to SICU.

## 2022-06-05 NOTE — ED PROVIDER NOTE - OBJECTIVE STATEMENT
Patient is a 83 yo male with PMHx a fib, HTN, HLD, NSVT, CAD, dementia presenting from home with neck pain and L rib pain s/p fall. As per patient and son at bedside patient had a witnessed mechanical fall at 5 PM yesterday when he tripped over his front stairs and struck his head as well as the L side of his body; denies syncope, CP, SOB, n/v/d. Patient follows with [place] [facility] and had an echo last year which showed chronic HF with preserved EF; patient states he has had balance issues for several weeks and has had multiple falls but denies syncope. Currently c/o midline cervical pain as well as L sided rib pain. Patient not interested in rehab but is interested in PT. Denies fevers, chills, dizziness, lightheadedness, dysphagia, dysarthria, diplopia, photophobia, syncope, cough, congestion, SOB, abdominal pain, diarrhea, dysuria, hematuria, hematochezia, hematemesis, n/v, recent travel, sick contacts, leg swelling.

## 2022-06-05 NOTE — PATIENT PROFILE ADULT - FALL HARM RISK - HARM RISK INTERVENTIONS

## 2022-06-05 NOTE — ED ADULT NURSE REASSESSMENT NOTE - NS ED NURSE REASSESS COMMENT FT1
Pt sleeping, easily arousable with light tactile stimulation.  Cardiac monitor showing sinus bradycardia with stable blood pressure.  Respirations 18, even and unlabored with oxygen saturation 98% on room air.  Side rails up. Awaiting transport to ICU>

## 2022-06-05 NOTE — H&P ADULT - GLASGOW COMA SCALE: SCORE, MLM
13 Continue Regimen: Aczone 7.5 % topical gel with pump \\nQuantity: 90.0 g  Days Supply: 30\\nSig: Apply by topical route to face at night Detail Level: Simple Otc Regimen: Cerave acne cleanser

## 2022-06-05 NOTE — H&P ADULT - NSHPPHYSICALEXAM_GEN_ALL_CORE
Constitutional:  Male in no acute distress, somnolent,but arousable  HEENT: Head is normocephalic and atraumatic, maxillofacial structures stable, no blood or discharge from nares or oral cavity, no gilliam sign / racoon eyes, EOMI b/l, pupils 2mm round and reactive to light b/l, no active drainage or redness  Neck: trachea midline  Respiratory: Breath sounds CTA b/l respirations are unlabored, no accessory muscle use, no conversational dyspnea  Cardiovascular: Regular rate & rhythm, +S1, S1, Chest wall is non-tender to palpation, no subQ emphysema or crepitus palpated  Gastrointestinal: Abdomen soft, non-tender, non-distended, no rebound tenderness / guarding, no ecchymosis or external signs of abdominal trauma  Musculoskeletal: Left chest TTP. moving all extremities spontaneously, 5/5 motor strength of b/l Upper and lower extremities. no point tenderness or deformity noted to upper or lower extremities b/l  Pelvis: stable  Vascular: 2+ radial, femoral, and DP pulses b/l  Neurological: GCS: 15 (V4/E2/M6). A&O x 3; no gross sensory / motor / coordination deficits  Neurospinal: no C/T/L/S spine tenderness to palpation, no step-offs or signs of external trauma to the back Constitutional:  Male in no acute distress, somnolent, but arousable  HEENT: Head is normocephalic and atraumatic, maxillofacial structures stable, no blood or discharge from nares or oral cavity, no gilliam sign / raccoon eyes, EOMI b/l, pupils 2mm round and reactive to light b/l, no active drainage or redness  Neck: trachea midline  Respiratory: Breath sounds CTA b/l respirations are unlabored, no accessory muscle use, no conversational dyspnea  Cardiovascular: Regular rate & rhythm, +S1, S1, Chest wall is non-tender to palpation, no subQ emphysema or crepitus palpated  Gastrointestinal: Abdomen soft, non-tender, non-distended, no rebound tenderness / guarding, no ecchymosis or external signs of abdominal trauma  Musculoskeletal: Left chest TTP. moving all extremities spontaneously, 5/5 motor strength of b/l Upper and lower extremities. no point tenderness or deformity noted to upper or lower extremities b/l  Pelvis: stable  Vascular: 2+ radial, femoral, and DP pulses b/l  Neurological: GCS: 15 (V4/E2/M6). A&O x 3; no gross sensory / motor / coordination deficits  Neurospinal: no C/T/L/S spine tenderness to palpation, no step-offs or signs of external trauma to the back

## 2022-06-05 NOTE — ED PROVIDER NOTE - CLINICAL SUMMARY MEDICAL DECISION MAKING FREE TEXT BOX
Patient is a 81 yo male with PMHx a fib, HTN, HLD, NSVT, CAD, dementia presenting from home with neck pain and L rib pain s/p fall. As per patient and son at bedside patient had a witnessed mechanical fall at 5 PM yesterday when he tripped over his front stairs and struck his head as well as the L side of his body; denies syncope, CP, SOB, n/v/d. VSS, uncomfortable appearing, L chest wall ttp no obvious deformities, lungs ctab, belly soft nontender, midline cervical ttp, aaox3, gcs 15, moving all extremities equally, full ROM at the hips knees ankles, pulses sensation reflexes intact. ECG wnl, troponin to r/o ACS. CT head/C-spine/chest to r/o fractures vs. bleed. CXR rvp to r/o PNA vs. URI. UA to r/o UTI. cbc cmp coags to r/o electrolyte abnormalities. Morphine for pain. Will continue to monitor.

## 2022-06-05 NOTE — H&P ADULT - HISTORY OF PRESENT ILLNESS
83 y/o M w/ PMH of  a fib, HTN, HLD, NSVT, CAD, dementia presenting from home with neck pain and L rib pain s/p fall. Patient's sister states that he had a witness fall yesterday evening down three stairs while leaving a friends house. Patient struck his head and left side on the fall, but denies syncope. Patient has become more somnolent today from his baseline per sister at bedside. Patient lives independently at home. Complaining of left sided chest pain.     Denies F/C/N/V/CP/SOB.     Patient follows routinely with Dr. Ana Maria Jennings for cardiology    A: Protected  B: Spontaneous breathing  C: 2+ Central and peripheral pulses  D: Somnolent, but arousable, GCS 13  E: No other obvious injuries on exposure 83 y/o M w/ PMH of  a fib (on ASA only), HTN, HLD, NSVT, CAD, dementia presenting from home with neck pain and L rib pain s/p fall. Patient's sister states that he had a witness fall yesterday evening down three stairs while leaving a friends house. Patient struck his head and left side on the fall, but denies syncope. Patient has become more somnolent today from his baseline per sister at bedside. Patient lives independently at home. Complaining of left sided chest pain.     Denies F/C/N/V/CP/SOB.     Patient follows routinely with Dr. Ana Maria Jennings for cardiology    A: Protected  B: Spontaneous breathing  C: 2+ Central and peripheral pulses  D: Somnolent, but arousable, GCS 13  E: No other obvious injuries on exposure

## 2022-06-05 NOTE — H&P ADULT - ASSESSMENT
81 y/o M w/ PMH of  a fib (on ASA only), HTN, HLD, NSVT, CAD, dementia presenting from home with neck pain and L rib pain s/p fall found to have Left posterior 7-9 rib fxs and Left Anterior 6-9 rib fxs. possibly post-concussive.   -Admit to SICU, Dr. Aragon  -Stat ABG  -Home meds  -PIC protocol  -PRN pain control  -OOB, IS use  Dispo: SICU

## 2022-06-05 NOTE — H&P ADULT - ATTENDING COMMENTS
Agree with above assessment.  The patient was seen and examined by myself with the surgical PA and resident. The patient is an 82 year old male with a history of dementia who is s/p fall yesterday.  The patient hit his left chest wall and flank in addition to his head when he fell.  He had no LOC and the fall was witnessed by his friends.  The patient is with complaints of pain along the left chest wall and flank.  He has no abdominal pain, nausea, or pelvic pain.  HEENT NC/AT PERRL EOMI no raccoon eyes, no gilliam signs, no cervical tenderness, chest with tenderness to palpation along the left chest wall, abdomen is soft and non tender, there is no pelvic tenderness, extremities without gross angulation or deformity.  Head and c-spine CT negative for acute traumatic injury.  Chest CT scan reveals fractures of the left posterior 7 -9th ribs abd the anterior 6-9th ribs.  No pneumothorax, no splenic or solid organ injury noted.  The patient is to be admitted to the trauma service in the SICU setting, PIC protocol, will need pain control, monitor mental status as patient is somnolent at times and suspect post concussive from head injury sustained during the fall.

## 2022-06-06 LAB
ANION GAP SERPL CALC-SCNC: 11 MMOL/L — SIGNIFICANT CHANGE UP (ref 5–17)
APPEARANCE UR: CLEAR — SIGNIFICANT CHANGE UP
BACTERIA # UR AUTO: ABNORMAL
BASOPHILS # BLD AUTO: 0.03 K/UL — SIGNIFICANT CHANGE UP (ref 0–0.2)
BASOPHILS NFR BLD AUTO: 0.5 % — SIGNIFICANT CHANGE UP (ref 0–2)
BILIRUB UR-MCNC: NEGATIVE — SIGNIFICANT CHANGE UP
BUN SERPL-MCNC: 15.2 MG/DL — SIGNIFICANT CHANGE UP (ref 8–20)
CALCIUM SERPL-MCNC: 8.6 MG/DL — SIGNIFICANT CHANGE UP (ref 8.6–10.2)
CHLORIDE SERPL-SCNC: 102 MMOL/L — SIGNIFICANT CHANGE UP (ref 98–107)
CO2 SERPL-SCNC: 25 MMOL/L — SIGNIFICANT CHANGE UP (ref 22–29)
COLOR SPEC: YELLOW — SIGNIFICANT CHANGE UP
CREAT SERPL-MCNC: 0.69 MG/DL — SIGNIFICANT CHANGE UP (ref 0.5–1.3)
DIFF PNL FLD: NEGATIVE — SIGNIFICANT CHANGE UP
EGFR: 92 ML/MIN/1.73M2 — SIGNIFICANT CHANGE UP
EOSINOPHIL # BLD AUTO: 0.32 K/UL — SIGNIFICANT CHANGE UP (ref 0–0.5)
EOSINOPHIL NFR BLD AUTO: 4.9 % — SIGNIFICANT CHANGE UP (ref 0–6)
EPI CELLS # UR: SIGNIFICANT CHANGE UP
GLUCOSE SERPL-MCNC: 91 MG/DL — SIGNIFICANT CHANGE UP (ref 70–99)
GLUCOSE UR QL: NEGATIVE MG/DL — SIGNIFICANT CHANGE UP
HCT VFR BLD CALC: 40.9 % — SIGNIFICANT CHANGE UP (ref 39–50)
HGB BLD-MCNC: 14 G/DL — SIGNIFICANT CHANGE UP (ref 13–17)
IMM GRANULOCYTES NFR BLD AUTO: 0.5 % — SIGNIFICANT CHANGE UP (ref 0–1.5)
KETONES UR-MCNC: NEGATIVE — SIGNIFICANT CHANGE UP
LEUKOCYTE ESTERASE UR-ACNC: NEGATIVE — SIGNIFICANT CHANGE UP
LYMPHOCYTES # BLD AUTO: 1.39 K/UL — SIGNIFICANT CHANGE UP (ref 1–3.3)
LYMPHOCYTES # BLD AUTO: 21.5 % — SIGNIFICANT CHANGE UP (ref 13–44)
MAGNESIUM SERPL-MCNC: 2.2 MG/DL — SIGNIFICANT CHANGE UP (ref 1.6–2.6)
MCHC RBC-ENTMCNC: 32.3 PG — SIGNIFICANT CHANGE UP (ref 27–34)
MCHC RBC-ENTMCNC: 34.2 GM/DL — SIGNIFICANT CHANGE UP (ref 32–36)
MCV RBC AUTO: 94.2 FL — SIGNIFICANT CHANGE UP (ref 80–100)
MONOCYTES # BLD AUTO: 0.77 K/UL — SIGNIFICANT CHANGE UP (ref 0–0.9)
MONOCYTES NFR BLD AUTO: 11.9 % — SIGNIFICANT CHANGE UP (ref 2–14)
MRSA PCR RESULT.: SIGNIFICANT CHANGE UP
NEUTROPHILS # BLD AUTO: 3.93 K/UL — SIGNIFICANT CHANGE UP (ref 1.8–7.4)
NEUTROPHILS NFR BLD AUTO: 60.7 % — SIGNIFICANT CHANGE UP (ref 43–77)
NITRITE UR-MCNC: NEGATIVE — SIGNIFICANT CHANGE UP
PH UR: 6 — SIGNIFICANT CHANGE UP (ref 5–8)
PHOSPHATE SERPL-MCNC: 3 MG/DL — SIGNIFICANT CHANGE UP (ref 2.4–4.7)
PLATELET # BLD AUTO: 156 K/UL — SIGNIFICANT CHANGE UP (ref 150–400)
POTASSIUM SERPL-MCNC: 3.6 MMOL/L — SIGNIFICANT CHANGE UP (ref 3.5–5.3)
POTASSIUM SERPL-SCNC: 3.6 MMOL/L — SIGNIFICANT CHANGE UP (ref 3.5–5.3)
PROT UR-MCNC: 15 MG/DL
RBC # BLD: 4.34 M/UL — SIGNIFICANT CHANGE UP (ref 4.2–5.8)
RBC # FLD: 13 % — SIGNIFICANT CHANGE UP (ref 10.3–14.5)
RBC CASTS # UR COMP ASSIST: NEGATIVE /HPF — SIGNIFICANT CHANGE UP (ref 0–4)
S AUREUS DNA NOSE QL NAA+PROBE: SIGNIFICANT CHANGE UP
SODIUM SERPL-SCNC: 138 MMOL/L — SIGNIFICANT CHANGE UP (ref 135–145)
SP GR SPEC: 1.01 — SIGNIFICANT CHANGE UP (ref 1.01–1.02)
UROBILINOGEN FLD QL: NEGATIVE MG/DL — SIGNIFICANT CHANGE UP
WBC # BLD: 6.47 K/UL — SIGNIFICANT CHANGE UP (ref 3.8–10.5)
WBC # FLD AUTO: 6.47 K/UL — SIGNIFICANT CHANGE UP (ref 3.8–10.5)
WBC UR QL: SIGNIFICANT CHANGE UP /HPF (ref 0–5)

## 2022-06-06 PROCEDURE — 71045 X-RAY EXAM CHEST 1 VIEW: CPT | Mod: 26

## 2022-06-06 PROCEDURE — 73030 X-RAY EXAM OF SHOULDER: CPT | Mod: 26,RT

## 2022-06-06 PROCEDURE — 99291 CRITICAL CARE FIRST HOUR: CPT

## 2022-06-06 RX ORDER — INFLUENZA VIRUS VACCINE 15; 15; 15; 15 UG/.5ML; UG/.5ML; UG/.5ML; UG/.5ML
0.7 SUSPENSION INTRAMUSCULAR ONCE
Refills: 0 | Status: DISCONTINUED | OUTPATIENT
Start: 2022-06-06 | End: 2022-06-06

## 2022-06-06 RX ORDER — POLYETHYLENE GLYCOL 3350 17 G/17G
17 POWDER, FOR SOLUTION ORAL DAILY
Refills: 0 | Status: DISCONTINUED | OUTPATIENT
Start: 2022-06-06 | End: 2022-06-09

## 2022-06-06 RX ORDER — POTASSIUM CHLORIDE 20 MEQ
40 PACKET (EA) ORAL ONCE
Refills: 0 | Status: COMPLETED | OUTPATIENT
Start: 2022-06-06 | End: 2022-06-06

## 2022-06-06 RX ORDER — ASPIRIN/CALCIUM CARB/MAGNESIUM 324 MG
81 TABLET ORAL DAILY
Refills: 0 | Status: DISCONTINUED | OUTPATIENT
Start: 2022-06-06 | End: 2022-06-09

## 2022-06-06 RX ORDER — BUPIVACAINE HCL/PF 7.5 MG/ML
30 VIAL (ML) INJECTION ONCE
Refills: 0 | Status: DISCONTINUED | OUTPATIENT
Start: 2022-06-06 | End: 2022-06-09

## 2022-06-06 RX ORDER — BACITRACIN ZINC 500 UNIT/G
1 OINTMENT IN PACKET (EA) TOPICAL EVERY 12 HOURS
Refills: 0 | Status: DISCONTINUED | OUTPATIENT
Start: 2022-06-06 | End: 2022-06-09

## 2022-06-06 RX ADMIN — Medication 975 MILLIGRAM(S): at 17:46

## 2022-06-06 RX ADMIN — LIDOCAINE 1 PATCH: 4 CREAM TOPICAL at 06:26

## 2022-06-06 RX ADMIN — Medication 975 MILLIGRAM(S): at 13:14

## 2022-06-06 RX ADMIN — Medication 975 MILLIGRAM(S): at 05:20

## 2022-06-06 RX ADMIN — Medication 81 MILLIGRAM(S): at 13:14

## 2022-06-06 RX ADMIN — SERTRALINE 50 MILLIGRAM(S): 25 TABLET, FILM COATED ORAL at 15:37

## 2022-06-06 RX ADMIN — SENNA PLUS 2 TABLET(S): 8.6 TABLET ORAL at 21:44

## 2022-06-06 RX ADMIN — Medication 15 MILLIGRAM(S): at 15:37

## 2022-06-06 RX ADMIN — Medication 975 MILLIGRAM(S): at 00:30

## 2022-06-06 RX ADMIN — Medication 975 MILLIGRAM(S): at 22:15

## 2022-06-06 RX ADMIN — OXYCODONE HYDROCHLORIDE 5 MILLIGRAM(S): 5 TABLET ORAL at 21:49

## 2022-06-06 RX ADMIN — Medication 975 MILLIGRAM(S): at 21:45

## 2022-06-06 RX ADMIN — LIDOCAINE 1 PATCH: 4 CREAM TOPICAL at 13:17

## 2022-06-06 RX ADMIN — Medication 975 MILLIGRAM(S): at 06:00

## 2022-06-06 RX ADMIN — AMIODARONE HYDROCHLORIDE 200 MILLIGRAM(S): 400 TABLET ORAL at 05:57

## 2022-06-06 RX ADMIN — HEPARIN SODIUM 5000 UNIT(S): 5000 INJECTION INTRAVENOUS; SUBCUTANEOUS at 05:47

## 2022-06-06 RX ADMIN — GABAPENTIN 100 MILLIGRAM(S): 400 CAPSULE ORAL at 05:21

## 2022-06-06 RX ADMIN — CHLORHEXIDINE GLUCONATE 1 APPLICATION(S): 213 SOLUTION TOPICAL at 13:15

## 2022-06-06 RX ADMIN — HEPARIN SODIUM 5000 UNIT(S): 5000 INJECTION INTRAVENOUS; SUBCUTANEOUS at 21:44

## 2022-06-06 RX ADMIN — DONEPEZIL HYDROCHLORIDE 5 MILLIGRAM(S): 10 TABLET, FILM COATED ORAL at 21:44

## 2022-06-06 RX ADMIN — Medication 1 APPLICATION(S): at 21:43

## 2022-06-06 RX ADMIN — HEPARIN SODIUM 5000 UNIT(S): 5000 INJECTION INTRAVENOUS; SUBCUTANEOUS at 13:14

## 2022-06-06 RX ADMIN — Medication 975 MILLIGRAM(S): at 14:00

## 2022-06-06 RX ADMIN — Medication 15 MILLIGRAM(S): at 16:00

## 2022-06-06 RX ADMIN — TAMSULOSIN HYDROCHLORIDE 0.4 MILLIGRAM(S): 0.4 CAPSULE ORAL at 21:44

## 2022-06-06 RX ADMIN — GABAPENTIN 100 MILLIGRAM(S): 400 CAPSULE ORAL at 21:44

## 2022-06-06 RX ADMIN — Medication 975 MILLIGRAM(S): at 17:57

## 2022-06-06 RX ADMIN — Medication 40 MILLIEQUIVALENT(S): at 06:37

## 2022-06-06 RX ADMIN — OXYCODONE HYDROCHLORIDE 5 MILLIGRAM(S): 5 TABLET ORAL at 22:19

## 2022-06-06 RX ADMIN — GABAPENTIN 100 MILLIGRAM(S): 400 CAPSULE ORAL at 13:14

## 2022-06-06 RX ADMIN — OXYCODONE HYDROCHLORIDE 5 MILLIGRAM(S): 5 TABLET ORAL at 09:47

## 2022-06-06 NOTE — PHYSICAL THERAPY INITIAL EVALUATION ADULT - GENERAL OBSERVATIONS, REHAB EVAL
Pt received supine in bed + telemetry//BP + Venous Compression Boots. C/o 0/10 pre and Post PT pain, however pt report "a bit" of pain with transfers.(+) Pain meds on board per RN. Pt agreeable to PT

## 2022-06-06 NOTE — PROGRESS NOTE ADULT - SUBJECTIVE AND OBJECTIVE BOX
24h Events: Patient admitted to the SICU s/p fall and found to have posterior left 7-9th and anterolateral left 6-9th rib fractures. Pic 8-9 with pain well controlled, 800-1000 on IS and strong cough. Multi-modal pain regimen started. All home meds restarted. Patient states he stumbled 2 weeks ago and has had right shoulder pain since. Did not go to doctor for it. No obvious deformities, but XR was ordered for evaluation. Tolerating DASH diet well. Voiding spontaneously. Denies HA, fever, chills, cough, chest pain, SOB, N/V.     ICU Vital Signs Last 24 Hrs  T(C): 36.7 (05 Jun 2022 23:25), Max: 37.7 (05 Jun 2022 20:21)  T(F): 98.1 (05 Jun 2022 23:25), Max: 99.9 (05 Jun 2022 20:21)  HR: 50 (05 Jun 2022 23:00) (50 - 62)  BP: 162/81 (05 Jun 2022 23:00) (128/78 - 178/90)  BP(mean): 101 (05 Jun 2022 23:00) (100 - 106)  ABP: --  ABP(mean): --  RR: 14 (05 Jun 2022 23:00) (13 - 18)  SpO2: 94% (05 Jun 2022 23:00) (94% - 100%)      I&O's Detail    05 Jun 2022 07:01  -  06 Jun 2022 00:29  --------------------------------------------------------  IN:  Total IN: 0 mL    OUT:    Voided (mL): 300 mL  Total OUT: 300 mL    Total NET: -300 mL      ABG - ( 05 Jun 2022 17:09 )  pH, Arterial: 7.410 pH, Blood: x     /  pCO2: 43    /  pO2: 70    / HCO3: 27    / Base Excess: 2.7   /  SaO2: 96.4        MEDICATIONS  (STANDING):  acetaminophen     Tablet .. 975 milliGRAM(s) Oral every 6 hours  aMIOdarone    Tablet 200 milliGRAM(s) Oral daily  chlorhexidine 2% Cloths 1 Application(s) Topical daily  donepezil 5 milliGRAM(s) Oral at bedtime  gabapentin 100 milliGRAM(s) Oral every 8 hours  heparin   Injectable 5000 Unit(s) SubCutaneous every 8 hours  lidocaine   4% Patch 1 Patch Transdermal daily  metoprolol tartrate 25 milliGRAM(s) Oral every 12 hours  senna 2 Tablet(s) Oral at bedtime  sertraline 50 milliGRAM(s) Oral daily  tamsulosin 0.4 milliGRAM(s) Oral at bedtime    MEDICATIONS  (PRN):  ketorolac   Injectable 15 milliGRAM(s) IV Push every 6 hours PRN Moderate Pain (4 - 6)  oxyCODONE    IR 2.5 milliGRAM(s) Oral every 6 hours PRN Moderate Pain (4 - 6)  oxyCODONE    IR 5 milliGRAM(s) Oral every 6 hours PRN Severe Pain (7 - 10)      Physical Exam:    Neurological: A&Ox3, pain controlled    Pulmonary: Room air, clear to auscultation bilaterally    Cardiovascular: regular rate & rhythm    Gastrointestinal: soft, non-tender, non-distended, no rebound / guarding    : voiding spontaneously    Skin: warm, dry, no diaphoresis, no pallor, cyanosis, or jaundice    LABS:  CBC Full  -  ( 05 Jun 2022 14:07 )  WBC Count : 10.04 K/uL  RBC Count : 4.22 M/uL  Hemoglobin : 13.4 g/dL  Hematocrit : 40.1 %  Platelet Count - Automated : 160 K/uL  Mean Cell Volume : 95.0 fl  Mean Cell Hemoglobin : 31.8 pg  Mean Cell Hemoglobin Concentration : 33.4 gm/dL  Auto Neutrophil # : 7.17 K/uL  Auto Lymphocyte # : 1.52 K/uL  Auto Monocyte # : 1.05 K/uL  Auto Eosinophil # : 0.21 K/uL  Auto Basophil # : 0.03 K/uL  Auto Neutrophil % : 71.4 %  Auto Lymphocyte % : 15.1 %  Auto Monocyte % : 10.5 %  Auto Eosinophil % : 2.1 %  Auto Basophil % : 0.3 %    06-05    136  |  101  |  21.2<H>  ----------------------------<  119<H>  4.2   |  24.0  |  0.81    Ca    8.6      05 Jun 2022 14:07    TPro  6.8  /  Alb  3.8  /  TBili  0.5  /  DBili  x   /  AST  19  /  ALT  17  /  AlkPhos  117  06-05    PT/INR - ( 05 Jun 2022 14:07 )   PT: 13.6 sec;   INR: 1.17 ratio         PTT - ( 05 Jun 2022 14:07 )  PTT:36.9 sec    RECENT CULTURES:      LIVER FUNCTIONS - ( 05 Jun 2022 14:07 )  Alb: 3.8 g/dL / Pro: 6.8 g/dL / ALK PHOS: 117 U/L / ALT: 17 U/L / AST: 19 U/L / GGT: x           CARDIAC MARKERS ( 05 Jun 2022 14:07 )  x     / <0.01 ng/mL / x     / x     / x

## 2022-06-06 NOTE — PHYSICAL THERAPY INITIAL EVALUATION ADULT - PERTINENT HX OF CURRENT PROBLEM, REHAB EVAL
82y Male M w/ PMH of a fib (on ASA only), HTN, HLD, NSVT, CAD, dementia presenting from home with neck pain and L rib pain s/p fall found to have Left posterior 7-9 rib fxs and Left Anterior 6-9 rib fxs. Right shoulder xray (-) for fx per NAMRATA Schumacher

## 2022-06-06 NOTE — PHYSICAL THERAPY INITIAL EVALUATION ADULT - RANGE OF MOTION EXAMINATION, REHAB EVAL
right shoulder limited to approx 90 degrees shoulder flexion, LUE limited to approx 75 degrees shoulder flexion./bilateral lower extremity ROM was WFL (within functional limits)

## 2022-06-06 NOTE — CONSULT NOTE ADULT - ASSESSMENT
Assessment  s/p fall with multiple rib fractures  Stable CAd s/p remote MI preserved EF  Chronic total mid LAD with collaterals  h/o NSVT suppressed on amio  s/p ILR  dementia  HTN  HLD Assessment  s/p fall with multiple rib fractures  ? element of autonomic dysfunction ? hypotension sec to aricept  Stable CAd s/p remote MI preserved EF, asx without angina or ischemia  Chronic total mid LAD with collaterals  h/o NSVT suppressed on amio  s/p ILR  dementia  HTN  HLD      Rec  cont low dose toprol/amio/asa/statin (Zocor 20 mg day)  Multiple rib fracture management as per surgery  will interrogate loop today

## 2022-06-06 NOTE — PROVIDER CONTACT NOTE (OTHER) - SITUATION
Pt to be discharged.  Pts brother came to unit saying that he can not care for brother at home.  Brother requesting letter stating that pt needs to [place] to [place].
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
## 2022-06-06 NOTE — PHYSICAL THERAPY INITIAL EVALUATION ADULT - CRITERIA FOR SKILLED THERAPEUTIC INTERVENTIONS
home with RW, Home PT and supervision/assist PRN vs JEREMI (pending progress)/impairments found/rehab potential/anticipated discharge recommendation

## 2022-06-06 NOTE — PROGRESS NOTE ADULT - CRITICAL CARE ATTENDING COMMENT
Denies any pain without movement.  Able to give moderate cough attempt.  Able to pull 1000 cc to 1500 cc on IS.  Only moderate cough.  CXR this am w/o evidence of hemothorax.  (PIC score 7 (3,2,2))    GEN:  Awakens, comfortable  NEURO:  Oriented to person and day but not to location (knew hospital but wasn't sure which one).  Oriented to month and year.  Chest:  Tenderness across left lateral chest, winces sharply in pain at any attempts in movement.  ABD:  Soft, non distended, non tender throughout  EXT:  Warm, no edema, L knee w/circular abrasion, no pain on passive motion, b/l knee incisions - well healed    Patient is an 82 year old gentleman w/hx of atrial fibrillation (only on ASA), NSVT on amiodarone,, HLD, HTN, Parkinson's disease with dementia who was admitted 6/5/22 after a fall down three stairs while at home sustaining L posterior 7-9th rib fractures, L anterolateral 6-9th rib fractures.    -Appreciate cardiology input - plant to evaluate loop recorder.  -OK to restart ASA per cardiology  -Will need improved pain control to allow for movement (now wincing terribly w/any attempt at movement) -Pain service eval pending.  -CXR clear  -OOB/PT/OT evaluation  -Bacitracin to superrficial knee wound  -Miralax as part of bowel regimen  -Possible transfer this afternoon if pain w/movement improved after rib block.    Discussed w/patient goals of care.  He is clear to say that his sister, Marika Dewitt, is to make decisions for him should he be unable to make decisions for himself.  Discussed/defined DNR/DNI.  He is clear to say that he would want to be resuscitated and would want to be intubated should he require it.

## 2022-06-06 NOTE — PHYSICAL THERAPY INITIAL EVALUATION ADULT - IMPAIRMENTS CONTRIBUTING TO GAIT DEVIATIONS, PT EVAL
Distance limited 2*2 pt with loose bowel incontinence./pain/impaired postural control/decreased strength

## 2022-06-06 NOTE — CONSULT NOTE ADULT - ASSESSMENT
82M  < from: CT Chest No Cont (06.05.22 @ 14:01) >  Posterior LEFT 7th-9th and   anterolateral LEFT 6th-9th rib  fractures.    < end of copied text >      Patient interested in nerve block for rib fractures  r/b/a discussed  written consent obtained and witnessed  Please see procedure note for details

## 2022-06-06 NOTE — CONSULT NOTE ADULT - SUBJECTIVE AND OBJECTIVE BOX
Patient is a 82y old  Male who presents with a chief complaint of Rib fractures (2022 07:53)      HPI:  81 y/o M w/ PMH of  a fib (on ASA only), HTN, HLD, NSVT, CAD, dementia presenting from home with neck pain and L rib pain s/p fall. Patient's sister states that he had a witness fall yesterday evening down three stairs while leaving a friends house. Patient struck his head and left side on the fall, but denies syncope. Patient has become more somnolent today from his baseline per sister at bedside. Patient lives independently at home. Complaining of left sided chest pain.     Interval Hx:  Patient seen during rounds  Patient reports pain to be mod controlled on current medications  intereted in nerve block as an addition to his pain therapy  r/b/a discussed  Patient denies sedation with medications     Analgesic Dosing for past 24 hours reviewed as below:  acetaminophen     Tablet ..   975 milliGRAM(s) Oral (22 @ 05:20)   975 milliGRAM(s) Oral (22 @ 23:07)   975 milliGRAM(s) Oral (22 @ 17:38)    donepezil   5 milliGRAM(s) Oral (22 @ 22:47)    gabapentin   100 milliGRAM(s) Oral (22 @ 05:21)   100 milliGRAM(s) Oral (22 @ 22:46)    ketorolac   Injectable   15 milliGRAM(s) IV Push (22 @ 17:49)    oxyCODONE    IR   5 milliGRAM(s) Oral (22 @ 09:47)          T(C): 36.4 (22 @ 11:14), Max: 37.7 (22 @ 20:21)  HR: 59 (22 @ 13:00) (46 - 61)  BP: 94/59 (22 @ 13:00) (94/59 - 178/90)  RR: 14 (22 @ 13:00) (11 - 20)  SpO2: 97% (22 @ 13:00) (94% - 100%)      22 @ 07:01  -  22 @ 07:00  --------------------------------------------------------  IN: 0 mL / OUT: 700 mL / NET: -700 mL    22 @ 07:01  -  22 @ 13:14  --------------------------------------------------------  IN: 240 mL / OUT: 400 mL / NET: -160 mL        acetaminophen     Tablet .. 975 milliGRAM(s) Oral every 6 hours  aMIOdarone    Tablet 200 milliGRAM(s) Oral daily  aspirin enteric coated 81 milliGRAM(s) Oral daily  BACItracin   Ointment 1 Application(s) Topical every 12 hours  BUpivacaine 0.25% Injectable 30 milliLiter(s) Local Injection once  chlorhexidine 2% Cloths 1 Application(s) Topical daily  donepezil 5 milliGRAM(s) Oral at bedtime  gabapentin 100 milliGRAM(s) Oral every 8 hours  heparin   Injectable 5000 Unit(s) SubCutaneous every 8 hours  ketorolac   Injectable 15 milliGRAM(s) IV Push every 6 hours PRN  lidocaine   4% Patch 1 Patch Transdermal daily  metoprolol tartrate 25 milliGRAM(s) Oral every 12 hours  oxyCODONE    IR 2.5 milliGRAM(s) Oral every 6 hours PRN  oxyCODONE    IR 5 milliGRAM(s) Oral every 6 hours PRN  polyethylene glycol 3350 17 Gram(s) Oral daily  senna 2 Tablet(s) Oral at bedtime  sertraline 50 milliGRAM(s) Oral daily  tamsulosin 0.4 milliGRAM(s) Oral at bedtime                          14.0   6.47  )-----------( 156      ( 2022 05:19 )             40.9         138  |  102  |  15.2  ----------------------------<  91  3.6   |  25.0  |  0.69    Ca    8.6      2022 05:19  Phos  3.0     06-06  Mg     2.2     06-06    TPro  6.8  /  Alb  3.8  /  TBili  0.5  /  DBili  x   /  AST  19  /  ALT  17  /  AlkPhos  117  06-05    PT/INR - ( 2022 14:07 )   PT: 13.6 sec;   INR: 1.17 ratio         PTT - ( 2022 14:07 )  PTT:36.9 sec  Urinalysis Basic - ( 2022 07:06 )    Color: Yellow / Appearance: Clear / S.010 / pH: x  Gluc: x / Ketone: Negative  / Bili: Negative / Urobili: Negative mg/dL   Blood: x / Protein: 15 mg/dL / Nitrite: Negative   Leuk Esterase: Negative / RBC: Negative /HPF / WBC 0-2 /HPF   Sq Epi: x / Non Sq Epi: Occasional / Bacteria: Occasional        Pain Service   [phone]

## 2022-06-06 NOTE — PHYSICAL THERAPY INITIAL EVALUATION ADULT - ADDITIONAL COMMENTS
Pt lives alone in a condo. No steps to navigate. Pt was independent PTA without an assist device. Pt does not own DME.

## 2022-06-07 ENCOUNTER — TRANSCRIPTION ENCOUNTER (OUTPATIENT)
Age: 82
End: 2022-06-07

## 2022-06-07 LAB
ANION GAP SERPL CALC-SCNC: 11 MMOL/L — SIGNIFICANT CHANGE UP (ref 5–17)
BASOPHILS # BLD AUTO: 0.05 K/UL — SIGNIFICANT CHANGE UP (ref 0–0.2)
BASOPHILS NFR BLD AUTO: 0.7 % — SIGNIFICANT CHANGE UP (ref 0–2)
BUN SERPL-MCNC: 21.9 MG/DL — HIGH (ref 8–20)
CALCIUM SERPL-MCNC: 8.4 MG/DL — LOW (ref 8.6–10.2)
CHLORIDE SERPL-SCNC: 101 MMOL/L — SIGNIFICANT CHANGE UP (ref 98–107)
CO2 SERPL-SCNC: 25 MMOL/L — SIGNIFICANT CHANGE UP (ref 22–29)
CREAT SERPL-MCNC: 0.83 MG/DL — SIGNIFICANT CHANGE UP (ref 0.5–1.3)
EGFR: 87 ML/MIN/1.73M2 — SIGNIFICANT CHANGE UP
EOSINOPHIL # BLD AUTO: 0.47 K/UL — SIGNIFICANT CHANGE UP (ref 0–0.5)
EOSINOPHIL NFR BLD AUTO: 6.5 % — HIGH (ref 0–6)
GLUCOSE SERPL-MCNC: 95 MG/DL — SIGNIFICANT CHANGE UP (ref 70–99)
HCT VFR BLD CALC: 42.3 % — SIGNIFICANT CHANGE UP (ref 39–50)
HGB BLD-MCNC: 13.8 G/DL — SIGNIFICANT CHANGE UP (ref 13–17)
IMM GRANULOCYTES NFR BLD AUTO: 0.4 % — SIGNIFICANT CHANGE UP (ref 0–1.5)
LYMPHOCYTES # BLD AUTO: 1.38 K/UL — SIGNIFICANT CHANGE UP (ref 1–3.3)
LYMPHOCYTES # BLD AUTO: 19 % — SIGNIFICANT CHANGE UP (ref 13–44)
MAGNESIUM SERPL-MCNC: 2.3 MG/DL — SIGNIFICANT CHANGE UP (ref 1.6–2.6)
MCHC RBC-ENTMCNC: 31.2 PG — SIGNIFICANT CHANGE UP (ref 27–34)
MCHC RBC-ENTMCNC: 32.6 GM/DL — SIGNIFICANT CHANGE UP (ref 32–36)
MCV RBC AUTO: 95.7 FL — SIGNIFICANT CHANGE UP (ref 80–100)
MONOCYTES # BLD AUTO: 0.76 K/UL — SIGNIFICANT CHANGE UP (ref 0–0.9)
MONOCYTES NFR BLD AUTO: 10.4 % — SIGNIFICANT CHANGE UP (ref 2–14)
NEUTROPHILS # BLD AUTO: 4.59 K/UL — SIGNIFICANT CHANGE UP (ref 1.8–7.4)
NEUTROPHILS NFR BLD AUTO: 63 % — SIGNIFICANT CHANGE UP (ref 43–77)
PHOSPHATE SERPL-MCNC: 2.8 MG/DL — SIGNIFICANT CHANGE UP (ref 2.4–4.7)
PLATELET # BLD AUTO: 169 K/UL — SIGNIFICANT CHANGE UP (ref 150–400)
POTASSIUM SERPL-MCNC: 4.2 MMOL/L — SIGNIFICANT CHANGE UP (ref 3.5–5.3)
POTASSIUM SERPL-SCNC: 4.2 MMOL/L — SIGNIFICANT CHANGE UP (ref 3.5–5.3)
RBC # BLD: 4.42 M/UL — SIGNIFICANT CHANGE UP (ref 4.2–5.8)
RBC # FLD: 13.1 % — SIGNIFICANT CHANGE UP (ref 10.3–14.5)
SARS-COV-2 RNA SPEC QL NAA+PROBE: SIGNIFICANT CHANGE UP
SODIUM SERPL-SCNC: 137 MMOL/L — SIGNIFICANT CHANGE UP (ref 135–145)
WBC # BLD: 7.28 K/UL — SIGNIFICANT CHANGE UP (ref 3.8–10.5)
WBC # FLD AUTO: 7.28 K/UL — SIGNIFICANT CHANGE UP (ref 3.8–10.5)

## 2022-06-07 RX ORDER — QUETIAPINE FUMARATE 200 MG/1
25 TABLET, FILM COATED ORAL ONCE
Refills: 0 | Status: DISCONTINUED | OUTPATIENT
Start: 2022-06-07 | End: 2022-06-08

## 2022-06-07 RX ORDER — LIDOCAINE 4 G/100G
0 CREAM TOPICAL
Qty: 0 | Refills: 0 | DISCHARGE
Start: 2022-06-07

## 2022-06-07 RX ORDER — POLYETHYLENE GLYCOL 3350 17 G/17G
17 POWDER, FOR SOLUTION ORAL
Qty: 0 | Refills: 0 | DISCHARGE
Start: 2022-06-07

## 2022-06-07 RX ORDER — OXYCODONE HYDROCHLORIDE 5 MG/1
1 TABLET ORAL
Qty: 0 | Refills: 0 | DISCHARGE
Start: 2022-06-07

## 2022-06-07 RX ORDER — HYDROMORPHONE HYDROCHLORIDE 2 MG/ML
0.25 INJECTION INTRAMUSCULAR; INTRAVENOUS; SUBCUTANEOUS ONCE
Refills: 0 | Status: DISCONTINUED | OUTPATIENT
Start: 2022-06-07 | End: 2022-06-07

## 2022-06-07 RX ORDER — ACETAMINOPHEN 500 MG
3 TABLET ORAL
Qty: 0 | Refills: 0 | DISCHARGE
Start: 2022-06-07

## 2022-06-07 RX ORDER — GABAPENTIN 400 MG/1
1 CAPSULE ORAL
Qty: 0 | Refills: 0 | DISCHARGE
Start: 2022-06-07

## 2022-06-07 RX ORDER — BACITRACIN ZINC 500 UNIT/G
1 OINTMENT IN PACKET (EA) TOPICAL
Qty: 0 | Refills: 0 | DISCHARGE
Start: 2022-06-07

## 2022-06-07 RX ADMIN — GABAPENTIN 100 MILLIGRAM(S): 400 CAPSULE ORAL at 05:33

## 2022-06-07 RX ADMIN — HEPARIN SODIUM 5000 UNIT(S): 5000 INJECTION INTRAVENOUS; SUBCUTANEOUS at 11:55

## 2022-06-07 RX ADMIN — SERTRALINE 50 MILLIGRAM(S): 25 TABLET, FILM COATED ORAL at 11:55

## 2022-06-07 RX ADMIN — HYDROMORPHONE HYDROCHLORIDE 0.25 MILLIGRAM(S): 2 INJECTION INTRAMUSCULAR; INTRAVENOUS; SUBCUTANEOUS at 23:35

## 2022-06-07 RX ADMIN — DONEPEZIL HYDROCHLORIDE 5 MILLIGRAM(S): 10 TABLET, FILM COATED ORAL at 21:13

## 2022-06-07 RX ADMIN — AMIODARONE HYDROCHLORIDE 200 MILLIGRAM(S): 400 TABLET ORAL at 05:33

## 2022-06-07 RX ADMIN — Medication 975 MILLIGRAM(S): at 12:24

## 2022-06-07 RX ADMIN — LIDOCAINE 1 PATCH: 4 CREAM TOPICAL at 21:11

## 2022-06-07 RX ADMIN — SENNA PLUS 2 TABLET(S): 8.6 TABLET ORAL at 21:13

## 2022-06-07 RX ADMIN — HEPARIN SODIUM 5000 UNIT(S): 5000 INJECTION INTRAVENOUS; SUBCUTANEOUS at 05:33

## 2022-06-07 RX ADMIN — Medication 975 MILLIGRAM(S): at 11:54

## 2022-06-07 RX ADMIN — GABAPENTIN 100 MILLIGRAM(S): 400 CAPSULE ORAL at 21:14

## 2022-06-07 RX ADMIN — OXYCODONE HYDROCHLORIDE 5 MILLIGRAM(S): 5 TABLET ORAL at 08:38

## 2022-06-07 RX ADMIN — OXYCODONE HYDROCHLORIDE 5 MILLIGRAM(S): 5 TABLET ORAL at 09:20

## 2022-06-07 RX ADMIN — HEPARIN SODIUM 5000 UNIT(S): 5000 INJECTION INTRAVENOUS; SUBCUTANEOUS at 21:13

## 2022-06-07 RX ADMIN — Medication 25 MILLIGRAM(S): at 17:32

## 2022-06-07 RX ADMIN — Medication 81 MILLIGRAM(S): at 11:54

## 2022-06-07 RX ADMIN — Medication 975 MILLIGRAM(S): at 05:33

## 2022-06-07 RX ADMIN — TAMSULOSIN HYDROCHLORIDE 0.4 MILLIGRAM(S): 0.4 CAPSULE ORAL at 21:13

## 2022-06-07 RX ADMIN — LIDOCAINE 1 PATCH: 4 CREAM TOPICAL at 11:55

## 2022-06-07 RX ADMIN — GABAPENTIN 100 MILLIGRAM(S): 400 CAPSULE ORAL at 11:55

## 2022-06-07 RX ADMIN — HYDROMORPHONE HYDROCHLORIDE 0.25 MILLIGRAM(S): 2 INJECTION INTRAMUSCULAR; INTRAVENOUS; SUBCUTANEOUS at 23:15

## 2022-06-07 RX ADMIN — Medication 1 APPLICATION(S): at 18:00

## 2022-06-07 RX ADMIN — Medication 1 APPLICATION(S): at 05:33

## 2022-06-07 RX ADMIN — Medication 15 MILLIGRAM(S): at 23:15

## 2022-06-07 RX ADMIN — Medication 25 MILLIGRAM(S): at 05:33

## 2022-06-07 RX ADMIN — Medication 15 MILLIGRAM(S): at 23:01

## 2022-06-07 NOTE — DISCHARGE NOTE PROVIDER - NSDCCPCAREPLAN_GEN_ALL_CORE_FT
PRINCIPAL DISCHARGE DIAGNOSIS  Diagnosis: Fracture of multiple ribs of left side  Assessment and Plan of Treatment: Pt may resume regular diet and showering as previous as tolerated.  It is recommended not to lift/pull/push anything heavy as it may increase your pain.  Continue to use the incentive spirometer once discharge.  Follow up with your PMD within 1 week from discharge.  Follow up with ACS clinic as needed.  Patient is advised to RETURN TO THE EMERGENCY DEPARTMENT for any of the following - worsening pain, fever/chills, nausea/vomiting, altered mental status, chest pain, shortness of breath, or any other new / worsening symptom.

## 2022-06-07 NOTE — DISCHARGE NOTE PROVIDER - HOSPITAL COURSE
81 y/o M w/ PMH of  a fib (on ASA only), HTN, HLD, NSVT, CAD, dementia presenting from home with neck pain and L rib pain s/p fall. Patient's sister states that he had a witness fall yesterday evening down three stairs while leaving a friends house. Patient struck his head and left side on the fall, but denies syncope. Patient has become more somnolent today from his baseline per sister at bedside. Patient lives independently at home. Complaining of left sided chest pain.     Denies F/C/N/V/CP/SOB.       CT BRAIN:  No significant change from the prior exam.  No mass effect, hemorrhage or evidence of acute intracranial pathology.    CT CERVICAL SPINE:  No fracture or traumatic subluxation.  Left thyroid nodule for which nonemergent thyroid sonography is advised   for further evaluation.        CT CAP:  IMPRESSION:  1.  Posterior LEFT 7th-9th and anterolateral LEFT 6th-9th rib  fractures.   Clinical correlation with regard to flail chest recommended.  2.  No associated intra-thoracoabdominal injury.  3.  3 x 2 cm low-density LEFT thyroid nodule.      Pt admitted to SICU for pain control.  Pain management consulted for rib block,    Rib block performed 6/6 with some relief.  Pt downgraded to floor.    Pain continued with movement.  Pain regimen readjusted.  Eval by PT recs for [facility].    Pt tolerating diet, voids, pain well controlled on PO meds.  Stable for d/c to [facility] today. 81 y/o M w/ PMH of  a fib (on ASA only), HTN, HLD, NSVT, CAD, dementia presenting from home with neck pain and L rib pain s/p fall. Patient's sister states that he had a witness fall yesterday evening down three stairs while leaving a friends house. Patient struck his head and left side on the fall, but denies syncope. Patient has become more somnolent today from his baseline per sister at bedside. Patient lives independently at home. Complaining of left sided chest pain.     Denies F/C/N/V/CP/SOB.       CT BRAIN:  No significant change from the prior exam.  No mass effect, hemorrhage or evidence of acute intracranial pathology.    CT CERVICAL SPINE:  No fracture or traumatic subluxation.  Left thyroid nodule for which nonemergent thyroid sonography is advised   for further evaluation.        CT CAP:  IMPRESSION:  1.  Posterior LEFT 7th-9th and anterolateral LEFT 6th-9th rib  fractures.   Clinical correlation with regard to flail chest recommended.  2.  No associated intra-thoracoabdominal injury.  3.  3 x 2 cm low-density LEFT thyroid nodule.      Pt admitted to SICU for pain control.  Pain management consulted for rib block,    Rib block performed 6/6 with some relief.  Pt downgraded to floor.    Pain continued with movement.  Pain regimen readjusted.  Eval by PT recs for [facility].    Pt tolerating diet, voids, pain well controlled on PO meds.  Was on 1:1 for agitation. Removed prior to discharge. Stable for d/c to [facility] today.

## 2022-06-07 NOTE — DISCHARGE NOTE PROVIDER - NSDCMRMEDTOKEN_GEN_ALL_CORE_FT
acetaminophen 325 mg oral tablet: 3 tab(s) orally every 6 hours  amiodarone 200 mg oral tablet: 1 tab(s) orally once a day  aspirin 81 mg oral delayed release tablet: 1 tab(s) orally once a day  bacitracin 500 units/g topical ointment: 1 application topically every 12 hours  calcium-vitamin D 200 mg-250 intl units oral tablet: 2 tab(s) orally 2 times a day  Co Q-10 100 mg oral capsule: 1 cap(s) orally once a day  docusate sodium 100 mg oral capsule: 1 cap(s) orally 3 times a day  donepezil 10 mg oral tablet: 1 tab(s) orally once a day  Flomax 0.4 mg oral capsule: 1 cap(s) orally once a day   Fosamax 70 mg oral tablet: 1 tab(s) orally once a week  gabapentin 100 mg oral capsule: 1 cap(s) orally every 8 hours  lidocaine 4% topical film: Apply topically to affected area once a day  memantine 10 mg oral tablet: 1 tab(s) orally once a day  oxyCODONE 5 mg oral tablet: 1 tab(s) orally every 6 hours, As needed, Severe Pain (7 - 10)  polyethylene glycol 3350 oral powder for reconstitution: 17 gram(s) orally once a day  senna oral tablet: 2 tab(s) orally once a day (at bedtime)  sertraline 50 mg oral tablet: 1 tab(s) orally once a day  Toprol-XL 50 mg oral tablet, extended release: 1 tab(s) orally once a day  Xanax 0.25 mg oral tablet: orally 2 times a day, As Needed  Zocor 20 mg oral tablet: 1 tab(s) orally once a day (at bedtime)

## 2022-06-07 NOTE — PROGRESS NOTE ADULT - SUBJECTIVE AND OBJECTIVE BOX
[facility]                              [address] [address]                                                 PHONE: [phone]                                                 FAX: [phone]  -----------------------------------------------------------------------------------------------  Pt seen and examined. FU for fall    Overnight events/Complaints: Pt reports rib pain. Breathing at baseline.    Vital Signs Last 24 Hrs  T(C): 36.4 (07 Jun 2022 07:30), Max: 36.5 (06 Jun 2022 16:01)  T(F): 97.5 (07 Jun 2022 07:30), Max: 97.7 (06 Jun 2022 16:01)  HR: 53 (07 Jun 2022 07:30) (51 - 67)  BP: 147/83 (07 Jun 2022 07:30) (94/59 - 163/84)  BP(mean): 98 (06 Jun 2022 17:56) (71 - 98)  RR: 18 (07 Jun 2022 07:30) (12 - 20)  SpO2: 98% (07 Jun 2022 07:30) (94% - 100%)  I&O's Summary    06 Jun 2022 07:01  -  07 Jun 2022 07:00  --------------------------------------------------------  IN: 480 mL / OUT: 700 mL / NET: -220 mL    PHYSICAL EXAM:  Constitutional: Appears well; alert and co-operative  HEENT:     Head: Normocephalic and atraumatic  Neck: supple. No JVD  Cardiovascular: regular S1 S2  Respiratory: Lungs clear to auscultation; no crepitations, no wheeze  Gastrointestinal:  Soft, Non-tender, + BS	  Musculoskeletal: Normal range of motion. No edema  Skin: Warm and dry. No cyanosis . No diaphoresis.  Neurologic: Alert oriented to time place and person. Normal strength and no tremor.        LABS:                        13.8   7.28  )-----------( 169      ( 07 Jun 2022 05:26 )             42.3     06-07    137  |  101  |  21.9<H>  ----------------------------<  95  4.2   |  25.0  |  0.83    Ca    8.4<L>      07 Jun 2022 05:26  Phos  2.8     06-07  Mg     2.3     06-07    TPro  6.8  /  Alb  3.8  /  TBili  0.5  /  DBili  x   /  AST  19  /  ALT  17  /  AlkPhos  117  06-05    CARDIAC MARKERS ( 05 Jun 2022 14:07 )  x     / <0.01 ng/mL / x     / x     / x          PT/INR - ( 05 Jun 2022 14:07 )   PT: 13.6 sec;   INR: 1.17 ratio         PTT - ( 05 Jun 2022 14:07 )  PTT:36.9 sec    RADIOLOGY & ADDITIONAL STUDIES: (reviewed)  CXR was independently visualized/reviewed  and demonstrated: no effusion    CARDIOLOGY TESTING:(reviewed)     12 lead EKG independently visualized/reviewed  and demonstrated Sinus bradycardiawith 1st degree A-V block     ECHOCARDIOGRAM independently visualized/reviewed and demonstrated :   Summary:   1. Normal left atrial size.   2. Normal wall motion. Left ventricular ejection fraction, by visual estimation, is 55 to 60%. Grade II diastolic dysfunction.   3. Normal right atrial size.   4. Normal right ventricular size and function.   5. No significant valvular abnormality.   6. There is no evidence of pericardial effusion.    MEDICATIONS:(reviewed)  MEDICATIONS  (STANDING):  acetaminophen     Tablet .. 975 milliGRAM(s) Oral every 6 hours  aMIOdarone    Tablet 200 milliGRAM(s) Oral daily  aspirin enteric coated 81 milliGRAM(s) Oral daily  BACItracin   Ointment 1 Application(s) Topical every 12 hours  BUpivacaine 0.25% Injectable 30 milliLiter(s) Local Injection once  donepezil 5 milliGRAM(s) Oral at bedtime  gabapentin 100 milliGRAM(s) Oral every 8 hours  heparin   Injectable 5000 Unit(s) SubCutaneous every 8 hours  lidocaine   4% Patch 1 Patch Transdermal daily  metoprolol tartrate 25 milliGRAM(s) Oral every 12 hours  polyethylene glycol 3350 17 Gram(s) Oral daily  senna 2 Tablet(s) Oral at bedtime  sertraline 50 milliGRAM(s) Oral daily  tamsulosin 0.4 milliGRAM(s) Oral at bedtime      ASSESSMENT AND PLAN:    82y Male with  HTN HLD dementia on Aricept CAD s/p remote apical MI, total mid LAD EF 60%, NSVT noninducible at EPS suppressed on amiodarone, s/p ILR for monitoring admitted after fall and multiple rib fractures.    Remote syncope sec to low BP.    ILR without events  Pain control  Continue amiodarone with metoprolol

## 2022-06-07 NOTE — DISCHARGE NOTE PROVIDER - NSFOLLOWUPCLINICS_GEN_ALL_ED_FT
[facility] Acute Care Surgery  Acute Care Surgery  [address] [place] [id number]  Phone: [phone]  Fax:

## 2022-06-07 NOTE — PROGRESS NOTE ADULT - SUBJECTIVE AND OBJECTIVE BOX
SUBJECTIVE/24 hour events:  Patient is a 82yMale s/p witnessed fall on aspirin sustaining multiple left sided rib fractures. Patient downgraded from the sicu overnight with no acute events, pic score 8-9, pulling >1000 on incentive spirometer, strong cough, pod#1 of rib block with good relief of pain . Patient complaining of right shoulder pain, xray performed and prelim negative for any injury. PT stating home with assist vs jamison pending progress, OT pending        Vital Signs Last 24 Hrs  T(C): 36.4 (06 Jun 2022 18:54), Max: 36.5 (06 Jun 2022 16:01)  T(F): 97.5 (06 Jun 2022 18:54), Max: 97.7 (06 Jun 2022 16:01)  HR: 67 (06 Jun 2022 18:54) (46 - 67)  BP: 163/84 (06 Jun 2022 18:54) (94/59 - 167/94)  BP(mean): 98 (06 Jun 2022 17:56) (71 - 110)  RR: 15 (06 Jun 2022 18:54) (11 - 20)  SpO2: 97% (06 Jun 2022 18:54) (94% - 100%)  Drug Dosing Weight  Height (cm): 177.8 (05 Jun 2022 12:39)  Weight (kg): 88.5 (05 Jun 2022 12:39)  BMI (kg/m2): 28 (05 Jun 2022 12:39)  BSA (m2): 2.07 (05 Jun 2022 12:39)  I&O's Detail    05 Jun 2022 07:01  -  06 Jun 2022 07:00  --------------------------------------------------------  IN:  Total IN: 0 mL    OUT:    Voided (mL): 700 mL  Total OUT: 700 mL    Total NET: -700 mL      06 Jun 2022 07:01  -  07 Jun 2022 00:52  --------------------------------------------------------  IN:    Oral Fluid: 480 mL  Total IN: 480 mL    OUT:    Voided (mL): 400 mL  Total OUT: 400 mL    Total NET: 80 mL        Allergies    No Known Allergies    Intolerances                              14.0   6.47  )-----------( 156      ( 06 Jun 2022 05:19 )             40.9   06-06    138  |  102  |  15.2  ----------------------------<  91  3.6   |  25.0  |  0.69    Ca    8.6      06 Jun 2022 05:19  Phos  3.0     06-06  Mg     2.2     06-06    TPro  6.8  /  Alb  3.8  /  TBili  0.5  /  DBili  x   /  AST  19  /  ALT  17  /  AlkPhos  117  06-05  PT/INR - ( 05 Jun 2022 14:07 )   PT: 13.6 sec;   INR: 1.17 ratio         PTT - ( 05 Jun 2022 14:07 )  PTT:36.9 sec    ROS:    PHYSICAL EXAM:  Constitutional: in good spirits   Respiratory: no respiratory distress, no dyspnea, no supplemental o2 needed, pulling >1000 on the incentive spirometer  Gastrointestinal: abdomen soft, non-tender, atraumatic  Genitourinary: voiding   Extremities: moving all extremities  Neurological: A&Ox3  Skin: warm, dry and no rashes           MEDICATIONS  (STANDING):  acetaminophen     Tablet .. 975 milliGRAM(s) Oral every 6 hours  aMIOdarone    Tablet 200 milliGRAM(s) Oral daily  aspirin enteric coated 81 milliGRAM(s) Oral daily  BACItracin   Ointment 1 Application(s) Topical every 12 hours  BUpivacaine 0.25% Injectable 30 milliLiter(s) Local Injection once  donepezil 5 milliGRAM(s) Oral at bedtime  gabapentin 100 milliGRAM(s) Oral every 8 hours  heparin   Injectable 5000 Unit(s) SubCutaneous every 8 hours  lidocaine   4% Patch 1 Patch Transdermal daily  metoprolol tartrate 25 milliGRAM(s) Oral every 12 hours  polyethylene glycol 3350 17 Gram(s) Oral daily  senna 2 Tablet(s) Oral at bedtime  sertraline 50 milliGRAM(s) Oral daily  tamsulosin 0.4 milliGRAM(s) Oral at bedtime    MEDICATIONS  (PRN):  ketorolac   Injectable 15 milliGRAM(s) IV Push every 6 hours PRN Moderate Pain (4 - 6)  oxyCODONE    IR 2.5 milliGRAM(s) Oral every 6 hours PRN Moderate Pain (4 - 6)  oxyCODONE    IR 5 milliGRAM(s) Oral every 6 hours PRN Severe Pain (7 - 10)      RADIOLOGY STUDIES:    CULTURES:

## 2022-06-08 PROCEDURE — 99231 SBSQ HOSP IP/OBS SF/LOW 25: CPT

## 2022-06-08 RX ORDER — QUETIAPINE FUMARATE 200 MG/1
25 TABLET, FILM COATED ORAL AT BEDTIME
Refills: 0 | Status: DISCONTINUED | OUTPATIENT
Start: 2022-06-08 | End: 2022-06-09

## 2022-06-08 RX ORDER — ACETAMINOPHEN 500 MG
1000 TABLET ORAL ONCE
Refills: 0 | Status: COMPLETED | OUTPATIENT
Start: 2022-06-08 | End: 2022-06-08

## 2022-06-08 RX ADMIN — Medication 400 MILLIGRAM(S): at 05:38

## 2022-06-08 RX ADMIN — Medication 25 MILLIGRAM(S): at 18:14

## 2022-06-08 RX ADMIN — SERTRALINE 50 MILLIGRAM(S): 25 TABLET, FILM COATED ORAL at 13:22

## 2022-06-08 RX ADMIN — Medication 81 MILLIGRAM(S): at 13:21

## 2022-06-08 RX ADMIN — POLYETHYLENE GLYCOL 3350 17 GRAM(S): 17 POWDER, FOR SOLUTION ORAL at 13:21

## 2022-06-08 RX ADMIN — LIDOCAINE 1 PATCH: 4 CREAM TOPICAL at 01:41

## 2022-06-08 RX ADMIN — AMIODARONE HYDROCHLORIDE 200 MILLIGRAM(S): 400 TABLET ORAL at 05:39

## 2022-06-08 RX ADMIN — HEPARIN SODIUM 5000 UNIT(S): 5000 INJECTION INTRAVENOUS; SUBCUTANEOUS at 05:39

## 2022-06-08 RX ADMIN — Medication 1000 MILLIGRAM(S): at 06:42

## 2022-06-08 RX ADMIN — HEPARIN SODIUM 5000 UNIT(S): 5000 INJECTION INTRAVENOUS; SUBCUTANEOUS at 13:21

## 2022-06-08 RX ADMIN — Medication 1 APPLICATION(S): at 05:49

## 2022-06-08 RX ADMIN — Medication 25 MILLIGRAM(S): at 05:39

## 2022-06-08 RX ADMIN — Medication 1 APPLICATION(S): at 18:15

## 2022-06-08 RX ADMIN — GABAPENTIN 100 MILLIGRAM(S): 400 CAPSULE ORAL at 05:39

## 2022-06-08 RX ADMIN — LIDOCAINE 1 PATCH: 4 CREAM TOPICAL at 13:20

## 2022-06-08 RX ADMIN — GABAPENTIN 100 MILLIGRAM(S): 400 CAPSULE ORAL at 13:21

## 2022-06-08 NOTE — CHART NOTE - NSCHARTNOTEFT_GEN_A_CORE
Nutrition Note: Aware pt downgraded from ICU. Chart reviewed; RD to follow up with full nutrition assessment per medical floor protocol.
SICU TRANSFER NOTE  -----------------------------  ICU Admission Date: 6/5  Transfer Date: 06-06-22 @ 17:35    Admission Diagnosis: Rib fx    Active Problems/injuries: Rib fx    Procedures: 6/6 rib block     Consultants:  [ ] Cardiology  [ ] Endocrine  [ ] Infectious Disease  [ ] Medicine  [ ]Neurosurgery  [ ] Ortho       [ ] Weight Bearing Status:  [ ] Palliative       [ ] Advanced Directives:    [ ] Physical Medicine and Rehab       [ ] Disposition :   [ ] Plastics  [ ] Pulmonary    Medications  acetaminophen     Tablet .. 975 milliGRAM(s) Oral every 6 hours  aMIOdarone    Tablet 200 milliGRAM(s) Oral daily  aspirin enteric coated 81 milliGRAM(s) Oral daily  BACItracin   Ointment 1 Application(s) Topical every 12 hours  BUpivacaine 0.25% Injectable 30 milliLiter(s) Local Injection once  donepezil 5 milliGRAM(s) Oral at bedtime  gabapentin 100 milliGRAM(s) Oral every 8 hours  heparin   Injectable 5000 Unit(s) SubCutaneous every 8 hours  ketorolac   Injectable 15 milliGRAM(s) IV Push every 6 hours PRN  lidocaine   4% Patch 1 Patch Transdermal daily  metoprolol tartrate 25 milliGRAM(s) Oral every 12 hours  oxyCODONE    IR 2.5 milliGRAM(s) Oral every 6 hours PRN  oxyCODONE    IR 5 milliGRAM(s) Oral every 6 hours PRN  polyethylene glycol 3350 17 Gram(s) Oral daily  senna 2 Tablet(s) Oral at bedtime  sertraline 50 milliGRAM(s) Oral daily  tamsulosin 0.4 milliGRAM(s) Oral at bedtime      [x ] I attest I have reviewed and reconciled all medications prior to transfer    IV Fluids  sodium chloride 0.9% Bolus:   250 milliLiter(s), IV Bolus, once, infuse over 60 Minute(s), Stop After 1 Doses  Special Instructions: Peripheral Line 1          I have discussed this case with ACS/Trauma team upon transfer and all questions regarding ICU course were answered.  The following items are to be followed up:  1. Adequate pain control  2. PIC protocol  3. Continue all home medicaitons  4. F/u card recommendations, pcm interrogated  5. F/u official reads of shoulder xray  6. SQH for dvt ppx
Late entry  Overnight patient became extremely agitated, screaming " HELP", cursing and becoming aggressive. I was on 3 tower when this started, he insisted getting out of bed, he was 2 person assisted to the chair, everyone at the bedside tried to reason with him, he knowingly admitted he was disorientated, and c/o of a lot pain and wincing when he tried to move and get up. He refused taking any pills " I have been taking them all day and almost threw up" we were able to give him IV toradol. We decided to move him to a private room with a 1:1 and I gave him a small dose of IV dilaudid 0. 25mg which seemed to help him calm down and eventually fall asleep, he dose have bouts of restlessness and agitation but is not getting out of bed and falls back to sleep. He may benefit from an additional rib block if he will participate, possible cause for the increased confusion and agitation could be to increased pain. Will give an additional dose of dilaudid if patient starts trying to get out of bed, screaming ect.. Will continue to monitor

## 2022-06-08 NOTE — PROGRESS NOTE ADULT - NS ATTEND AMEND GEN_ALL_CORE FT
I have seen and examined the patient during rounds form [id number] hrs  events noted    awake  pain under control.  Seroquel for night agitation.  Multimodality algesia  DVT chemoprophylaxes   Dispo planning

## 2022-06-08 NOTE — PROGRESS NOTE ADULT - SUBJECTIVE AND OBJECTIVE BOX
SUBJECTIVE/24 hour events:   Patient is a 82yMale s/p witnessed fall on aspirin sustaining multiple left sided rib fractures. Patient overnight began to , pic score 8-9, pulling >1000 on incentive spirometer, strong cough, pod#1 of rib block with good relief of pain . Patient complaining of right shoulder pain, xray performed and prelim negative for any injury. PT stating home with assist vs jamison pending progress, OT pending       Vital Signs Last 24 Hrs  T(C): 36.6 (07 Jun 2022 21:00), Max: 36.6 (07 Jun 2022 21:00)  T(F): 97.9 (07 Jun 2022 21:00), Max: 97.9 (07 Jun 2022 21:00)  HR: 72 (07 Jun 2022 21:00) (53 - 72)  BP: 160/73 (07 Jun 2022 21:00) (147/83 - 160/73)  BP(mean): --  RR: 19 (07 Jun 2022 21:00) (15 - 20)  SpO2: 97% (07 Jun 2022 21:00) (95% - 100%)  Drug Dosing Weight  Height (cm): 177.8 (05 Jun 2022 12:39)  Weight (kg): 88.5 (05 Jun 2022 12:39)  BMI (kg/m2): 28 (05 Jun 2022 12:39)  BSA (m2): 2.07 (05 Jun 2022 12:39)  I&O's Detail    06 Jun 2022 07:01  -  07 Jun 2022 07:00  --------------------------------------------------------  IN:    Oral Fluid: 480 mL  Total IN: 480 mL    OUT:    Voided (mL): 700 mL  Total OUT: 700 mL    Total NET: -220 mL      07 Jun 2022 07:01  -  08 Jun 2022 02:00  --------------------------------------------------------  IN:  Total IN: 0 mL    OUT:    Voided (mL): 700 mL  Total OUT: 700 mL    Total NET: -700 mL        Allergies    No Known Allergies    Intolerances                              13.8   7.28  )-----------( 169      ( 07 Jun 2022 05:26 )             42.3   06-07    137  |  101  |  21.9<H>  ----------------------------<  95  4.2   |  25.0  |  0.83    Ca    8.4<L>      07 Jun 2022 05:26  Phos  2.8     06-07  Mg     2.3     06-07        ROS:    PHYSICAL EXAM:  Constitutional:  Eyes:  ENMT:  Neck:  Breasts:  Back:  Respiratory:  Cardiovascular:  Gastrointestinal:  Genitourinary:  Rectal:  Extremities:  Vascular:  Neurological:  Skin:  Musculoskeletal:  Psychiatric:        MEDICATIONS  (STANDING):  aMIOdarone    Tablet 200 milliGRAM(s) Oral daily  aspirin enteric coated 81 milliGRAM(s) Oral daily  BACItracin   Ointment 1 Application(s) Topical every 12 hours  BUpivacaine 0.25% Injectable 30 milliLiter(s) Local Injection once  donepezil 5 milliGRAM(s) Oral at bedtime  gabapentin 100 milliGRAM(s) Oral every 8 hours  heparin   Injectable 5000 Unit(s) SubCutaneous every 8 hours  lidocaine   4% Patch 1 Patch Transdermal daily  metoprolol tartrate 25 milliGRAM(s) Oral every 12 hours  polyethylene glycol 3350 17 Gram(s) Oral daily  QUEtiapine 25 milliGRAM(s) Oral once  senna 2 Tablet(s) Oral at bedtime  sertraline 50 milliGRAM(s) Oral daily  tamsulosin 0.4 milliGRAM(s) Oral at bedtime    MEDICATIONS  (PRN):  ketorolac   Injectable 15 milliGRAM(s) IV Push every 6 hours PRN Moderate Pain (4 - 6)  oxyCODONE    IR 2.5 milliGRAM(s) Oral every 6 hours PRN Moderate Pain (4 - 6)  oxyCODONE    IR 5 milliGRAM(s) Oral every 6 hours PRN Severe Pain (7 - 10)      RADIOLOGY STUDIES:    CULTURES:         SUBJECTIVE/24 hour events:   Patient is a 82yMale s/p witnessed fall on aspirin sustaining multiple left sided rib fractures. Patient overnight began to sundown and disorientated, refused all medications, and complaining of pain and finally was able to give him a dose of dilaudid 0.25mg IV and placed on 1:1 in a private room and patient calmed down and fell asleep. Unable to assess pic score 2/2 to mental status, but no respiratory compromise overnight.  He is  pod#2 of a rib block likely would benefit from an additional blook. PT dispo to [facility]    Vital Signs Last 24 Hrs  T(C): 36.6 (07 Jun 2022 21:00), Max: 36.6 (07 Jun 2022 21:00)  T(F): 97.9 (07 Jun 2022 21:00), Max: 97.9 (07 Jun 2022 21:00)  HR: 72 (07 Jun 2022 21:00) (53 - 72)  BP: 160/73 (07 Jun 2022 21:00) (147/83 - 160/73)  BP(mean): --  RR: 19 (07 Jun 2022 21:00) (15 - 20)  SpO2: 97% (07 Jun 2022 21:00) (95% - 100%)  Drug Dosing Weight  Height (cm): 177.8 (05 Jun 2022 12:39)  Weight (kg): 88.5 (05 Jun 2022 12:39)  BMI (kg/m2): 28 (05 Jun 2022 12:39)  BSA (m2): 2.07 (05 Jun 2022 12:39)  I&O's Detail    06 Jun 2022 07:01  -  07 Jun 2022 07:00  --------------------------------------------------------  IN:    Oral Fluid: 480 mL  Total IN: 480 mL    OUT:    Voided (mL): 700 mL  Total OUT: 700 mL    Total NET: -220 mL      07 Jun 2022 07:01  -  08 Jun 2022 02:00  --------------------------------------------------------  IN:  Total IN: 0 mL    OUT:    Voided (mL): 700 mL  Total OUT: 700 mL    Total NET: -700 mL        Allergies    No Known Allergies    Intolerances                              13.8   7.28  )-----------( 169      ( 07 Jun 2022 05:26 )             42.3   06-07    137  |  101  |  21.9<H>  ----------------------------<  95  4.2   |  25.0  |  0.83    Ca    8.4<L>      07 Jun 2022 05:26  Phos  2.8     06-07  Mg     2.3     06-07        ROS:    PHYSICAL EXAM:  Constitutional: in good spirits   Respiratory: no respiratory distress, no dyspnea, no supplemental o2 needed,   Gastrointestinal: abdomen soft, non-tender, atraumatic  Genitourinary: voiding   Extremities: moving all extremities  Neurological: confused, agitated " wheres the baby"  Skin: warm, dry and no rashes           MEDICATIONS  (STANDING):  aMIOdarone    Tablet 200 milliGRAM(s) Oral daily  aspirin enteric coated 81 milliGRAM(s) Oral daily  BACItracin   Ointment 1 Application(s) Topical every 12 hours  BUpivacaine 0.25% Injectable 30 milliLiter(s) Local Injection once  donepezil 5 milliGRAM(s) Oral at bedtime  gabapentin 100 milliGRAM(s) Oral every 8 hours  heparin   Injectable 5000 Unit(s) SubCutaneous every 8 hours  lidocaine   4% Patch 1 Patch Transdermal daily  metoprolol tartrate 25 milliGRAM(s) Oral every 12 hours  polyethylene glycol 3350 17 Gram(s) Oral daily  QUEtiapine 25 milliGRAM(s) Oral once  senna 2 Tablet(s) Oral at bedtime  sertraline 50 milliGRAM(s) Oral daily  tamsulosin 0.4 milliGRAM(s) Oral at bedtime    MEDICATIONS  (PRN):  ketorolac   Injectable 15 milliGRAM(s) IV Push every 6 hours PRN Moderate Pain (4 - 6)  oxyCODONE    IR 2.5 milliGRAM(s) Oral every 6 hours PRN Moderate Pain (4 - 6)  oxyCODONE    IR 5 milliGRAM(s) Oral every 6 hours PRN Severe Pain (7 - 10)      RADIOLOGY STUDIES:    CULTURES:

## 2022-06-08 NOTE — PROGRESS NOTE ADULT - SUBJECTIVE AND OBJECTIVE BOX
[facility]                              [address] [address]                                                 PHONE: [phone]                                                 FAX: [phone]  -----------------------------------------------------------------------------------------------  Pt seen and examined. FU for fall    Overnight events/Complaints:     Had confusion overnight presently alert  Pt reports rib pain. Breathing at baseline.    Vital Signs Last 24 Hrs  T(C): 36.4 (07 Jun 2022 07:30), Max: 36.5 (06 Jun 2022 16:01)  T(F): 97.5 (07 Jun 2022 07:30), Max: 97.7 (06 Jun 2022 16:01)  HR: 53 (07 Jun 2022 07:30) (51 - 67)  BP: 147/83 (07 Jun 2022 07:30) (94/59 - 163/84)  BP(mean): 98 (06 Jun 2022 17:56) (71 - 98)  RR: 18 (07 Jun 2022 07:30) (12 - 20)  SpO2: 98% (07 Jun 2022 07:30) (94% - 100%)  I&O's Summary    06 Jun 2022 07:01  -  07 Jun 2022 07:00  --------------------------------------------------------  IN: 480 mL / OUT: 700 mL / NET: -220 mL    PHYSICAL EXAM:  Constitutional: Appears well; alert and co-operative  HEENT:     Head: Normocephalic and atraumatic  Neck: supple. No JVD  Cardiovascular: regular S1 S2  Respiratory: Lungs clear to auscultation; no crepitations, no wheeze  Gastrointestinal:  Soft, Non-tender, + BS	  Musculoskeletal: Normal range of motion. No edema  Skin: Warm and dry. No cyanosis . No diaphoresis.  Neurologic: Alert oriented to time place and person. Normal strength and no tremor.        LABS:                        13.8   7.28  )-----------( 169      ( 07 Jun 2022 05:26 )             42.3     06-07    137  |  101  |  21.9<H>  ----------------------------<  95  4.2   |  25.0  |  0.83    Ca    8.4<L>      07 Jun 2022 05:26  Phos  2.8     06-07  Mg     2.3     06-07    TPro  6.8  /  Alb  3.8  /  TBili  0.5  /  DBili  x   /  AST  19  /  ALT  17  /  AlkPhos  117  06-05    CARDIAC MARKERS ( 05 Jun 2022 14:07 )  x     / <0.01 ng/mL / x     / x     / x          PT/INR - ( 05 Jun 2022 14:07 )   PT: 13.6 sec;   INR: 1.17 ratio         PTT - ( 05 Jun 2022 14:07 )  PTT:36.9 sec    RADIOLOGY & ADDITIONAL STUDIES: (reviewed)  CXR was independently visualized/reviewed  and demonstrated: no effusion    CARDIOLOGY TESTING:(reviewed)     12 lead EKG independently visualized/reviewed  and demonstrated Sinus bradycardiawith 1st degree A-V block     ECHOCARDIOGRAM independently visualized/reviewed and demonstrated :   Summary:   1. Normal left atrial size.   2. Normal wall motion. Left ventricular ejection fraction, by visual estimation, is 55 to 60%. Grade II diastolic dysfunction.   3. Normal right atrial size.   4. Normal right ventricular size and function.   5. No significant valvular abnormality.   6. There is no evidence of pericardial effusion.    MEDICATIONS:(reviewed)  MEDICATIONS  (STANDING):  acetaminophen     Tablet .. 975 milliGRAM(s) Oral every 6 hours  aMIOdarone    Tablet 200 milliGRAM(s) Oral daily  aspirin enteric coated 81 milliGRAM(s) Oral daily  BACItracin   Ointment 1 Application(s) Topical every 12 hours  BUpivacaine 0.25% Injectable 30 milliLiter(s) Local Injection once  donepezil 5 milliGRAM(s) Oral at bedtime  gabapentin 100 milliGRAM(s) Oral every 8 hours  heparin   Injectable 5000 Unit(s) SubCutaneous every 8 hours  lidocaine   4% Patch 1 Patch Transdermal daily  metoprolol tartrate 25 milliGRAM(s) Oral every 12 hours  polyethylene glycol 3350 17 Gram(s) Oral daily  senna 2 Tablet(s) Oral at bedtime  sertraline 50 milliGRAM(s) Oral daily  tamsulosin 0.4 milliGRAM(s) Oral at bedtime      ASSESSMENT AND PLAN:    82y Male with  HTN HLD dementia on Aricept CAD s/p remote apical MI, total mid LAD EF 60%, NSVT noninducible at EPS suppressed on amiodarone, s/p ILR for monitoring admitted after fall and multiple rib fractures.    Remote syncope sec to low BP.    Cont present cardiac therapy  ILR without events  Pain control  Will FUP as oupatient

## 2022-06-09 ENCOUNTER — TRANSCRIPTION ENCOUNTER (OUTPATIENT)
Age: 82
End: 2022-06-09

## 2022-06-09 VITALS
OXYGEN SATURATION: 96 % | DIASTOLIC BLOOD PRESSURE: 82 MMHG | TEMPERATURE: 98 F | SYSTOLIC BLOOD PRESSURE: 176 MMHG | HEART RATE: 71 BPM | RESPIRATION RATE: 18 BRPM

## 2022-06-09 PROCEDURE — 86850 RBC ANTIBODY SCREEN: CPT

## 2022-06-09 PROCEDURE — 81001 URINALYSIS AUTO W/SCOPE: CPT

## 2022-06-09 PROCEDURE — U0005: CPT

## 2022-06-09 PROCEDURE — 84484 ASSAY OF TROPONIN QUANT: CPT

## 2022-06-09 PROCEDURE — 99285 EMERGENCY DEPT VISIT HI MDM: CPT | Mod: 25

## 2022-06-09 PROCEDURE — 83690 ASSAY OF LIPASE: CPT

## 2022-06-09 PROCEDURE — 93005 ELECTROCARDIOGRAM TRACING: CPT

## 2022-06-09 PROCEDURE — 86901 BLOOD TYPING SEROLOGIC RH(D): CPT

## 2022-06-09 PROCEDURE — 36415 COLL VENOUS BLD VENIPUNCTURE: CPT

## 2022-06-09 PROCEDURE — 72125 CT NECK SPINE W/O DYE: CPT | Mod: MA

## 2022-06-09 PROCEDURE — 85610 PROTHROMBIN TIME: CPT

## 2022-06-09 PROCEDURE — 85025 COMPLETE CBC W/AUTO DIFF WBC: CPT

## 2022-06-09 PROCEDURE — 97163 PT EVAL HIGH COMPLEX 45 MIN: CPT

## 2022-06-09 PROCEDURE — 82803 BLOOD GASES ANY COMBINATION: CPT

## 2022-06-09 PROCEDURE — 84100 ASSAY OF PHOSPHORUS: CPT

## 2022-06-09 PROCEDURE — 71045 X-RAY EXAM CHEST 1 VIEW: CPT

## 2022-06-09 PROCEDURE — U0003: CPT

## 2022-06-09 PROCEDURE — 74176 CT ABD & PELVIS W/O CONTRAST: CPT | Mod: MA

## 2022-06-09 PROCEDURE — 87640 STAPH A DNA AMP PROBE: CPT

## 2022-06-09 PROCEDURE — 87641 MR-STAPH DNA AMP PROBE: CPT

## 2022-06-09 PROCEDURE — 70450 CT HEAD/BRAIN W/O DYE: CPT | Mod: MA

## 2022-06-09 PROCEDURE — 0225U NFCT DS DNA&RNA 21 SARSCOV2: CPT

## 2022-06-09 PROCEDURE — 83735 ASSAY OF MAGNESIUM: CPT

## 2022-06-09 PROCEDURE — 80307 DRUG TEST PRSMV CHEM ANLYZR: CPT

## 2022-06-09 PROCEDURE — 71250 CT THORAX DX C-: CPT | Mod: MA

## 2022-06-09 PROCEDURE — 80048 BASIC METABOLIC PNL TOTAL CA: CPT

## 2022-06-09 PROCEDURE — 97530 THERAPEUTIC ACTIVITIES: CPT

## 2022-06-09 PROCEDURE — 85730 THROMBOPLASTIN TIME PARTIAL: CPT

## 2022-06-09 PROCEDURE — 97116 GAIT TRAINING THERAPY: CPT

## 2022-06-09 PROCEDURE — 80053 COMPREHEN METABOLIC PANEL: CPT

## 2022-06-09 PROCEDURE — 86900 BLOOD TYPING SEROLOGIC ABO: CPT

## 2022-06-09 PROCEDURE — 73030 X-RAY EXAM OF SHOULDER: CPT

## 2022-06-09 RX ADMIN — GABAPENTIN 100 MILLIGRAM(S): 400 CAPSULE ORAL at 13:03

## 2022-06-09 RX ADMIN — HEPARIN SODIUM 5000 UNIT(S): 5000 INJECTION INTRAVENOUS; SUBCUTANEOUS at 13:04

## 2022-06-09 RX ADMIN — AMIODARONE HYDROCHLORIDE 200 MILLIGRAM(S): 400 TABLET ORAL at 05:18

## 2022-06-09 RX ADMIN — Medication 1 APPLICATION(S): at 05:19

## 2022-06-09 RX ADMIN — LIDOCAINE 1 PATCH: 4 CREAM TOPICAL at 13:03

## 2022-06-09 RX ADMIN — HEPARIN SODIUM 5000 UNIT(S): 5000 INJECTION INTRAVENOUS; SUBCUTANEOUS at 05:18

## 2022-06-09 RX ADMIN — SERTRALINE 50 MILLIGRAM(S): 25 TABLET, FILM COATED ORAL at 13:03

## 2022-06-09 RX ADMIN — GABAPENTIN 100 MILLIGRAM(S): 400 CAPSULE ORAL at 05:18

## 2022-06-09 RX ADMIN — Medication 81 MILLIGRAM(S): at 13:03

## 2022-06-09 RX ADMIN — Medication 25 MILLIGRAM(S): at 05:18

## 2022-06-09 NOTE — DISCHARGE NOTE NURSING/CASE MANAGEMENT/SOCIAL WORK - NSDCPEFALRISK_GEN_ALL_CORE
For information on Fall & Injury Prevention, visit: [email or web address] OR  [email or web address] OR  https://www.cdc.gov/steadi/patient.html

## 2022-06-09 NOTE — PROGRESS NOTE ADULT - SUBJECTIVE AND OBJECTIVE BOX
SUBJECTIVE/24 hour events:   Patient is a 82yMale s/p witnessed fall on aspirin sustaining multiple left sided rib fractures. Patient overnight calm and cooperative, 1:1 discontinued and he allowed the RN to administer his medications including seroquel 25mg that was started, no respiratory compromise.  He is  pod#3 of a rib block likely would benefit from an additional block. PT dispo to [facility]        Vital Signs Last 24 Hrs  T(C): 36.9 (08 Jun 2022 15:30), Max: 36.9 (08 Jun 2022 15:30)  T(F): 98.5 (08 Jun 2022 15:30), Max: 98.5 (08 Jun 2022 15:30)  HR: 63 (08 Jun 2022 15:30) (63 - 67)  BP: 125/72 (08 Jun 2022 15:30) (125/72 - 166/90)  BP(mean): --  RR: 18 (08 Jun 2022 15:30) (18 - 18)  SpO2: 92% (08 Jun 2022 15:30) (92% - 95%)  Drug Dosing Weight  Height (cm): 177.8 (05 Jun 2022 12:39)  Weight (kg): 88.5 (05 Jun 2022 12:39)  BMI (kg/m2): 28 (05 Jun 2022 12:39)  BSA (m2): 2.07 (05 Jun 2022 12:39)  I&O's Detail    07 Jun 2022 07:01  -  08 Jun 2022 07:00  --------------------------------------------------------  IN:  Total IN: 0 mL    OUT:    Voided (mL): 1200 mL  Total OUT: 1200 mL    Total NET: -1200 mL      08 Jun 2022 07:01  -  09 Jun 2022 04:34  --------------------------------------------------------  IN:    Oral Fluid: 100 mL  Total IN: 100 mL    OUT:    Voided (mL): 200 mL  Total OUT: 200 mL    Total NET: -100 mL        Allergies    No Known Allergies    Intolerances                              13.8   7.28  )-----------( 169      ( 07 Jun 2022 05:26 )             42.3   06-07    137  |  101  |  21.9<H>  ----------------------------<  95  4.2   |  25.0  |  0.83    Ca    8.4<L>      07 Jun 2022 05:26  Phos  2.8     06-07  Mg     2.3     06-07        ROS:      PHYSICAL EXAM:  Constitutional: in good spirits   Respiratory: no respiratory distress, no dyspnea, no supplemental o2 needed,   Gastrointestinal: abdomen soft, non-tender, atraumatic  Genitourinary: voiding   Extremities: moving all extremities  Neurological: calm and cooperative   Skin: warm, dry and no rashes           MEDICATIONS  (STANDING):  aMIOdarone    Tablet 200 milliGRAM(s) Oral daily  aspirin enteric coated 81 milliGRAM(s) Oral daily  BACItracin   Ointment 1 Application(s) Topical every 12 hours  BUpivacaine 0.25% Injectable 30 milliLiter(s) Local Injection once  donepezil 5 milliGRAM(s) Oral at bedtime  gabapentin 100 milliGRAM(s) Oral every 8 hours  heparin   Injectable 5000 Unit(s) SubCutaneous every 8 hours  lidocaine   4% Patch 1 Patch Transdermal daily  metoprolol tartrate 25 milliGRAM(s) Oral every 12 hours  polyethylene glycol 3350 17 Gram(s) Oral daily  QUEtiapine 25 milliGRAM(s) Oral at bedtime  senna 2 Tablet(s) Oral at bedtime  sertraline 50 milliGRAM(s) Oral daily  tamsulosin 0.4 milliGRAM(s) Oral at bedtime    MEDICATIONS  (PRN):  ketorolac   Injectable 15 milliGRAM(s) IV Push every 6 hours PRN Moderate Pain (4 - 6)      RADIOLOGY STUDIES:    CULTURES:

## 2022-06-09 NOTE — DISCHARGE NOTE NURSING/CASE MANAGEMENT/SOCIAL WORK - PATIENT PORTAL LINK FT
You can access the FollowMyHealth Patient Portal offered by [facility] by registering at the following website: [email or web address]. By joining [facility]’s FollowMyHealth portal, you will also be able to view your health information using other applications (apps) compatible with our system.

## 2022-06-09 NOTE — PROGRESS NOTE ADULT - ASSESSMENT
82y Male M w/ PMH of a fib (on ASA only), HTN, HLD, NSVT, CAD, dementia presenting from home with neck pain and L rib pain s/p fall found to have Left posterior 7-9 rib fxs and Left Anterior 6-9 rib fxs.     Plan:    Neuro:   - pic protocol...pic 8-9 currently  - multi-modal pain regimen. Minimize narcotics  - Delirium precautions  - Optimize sleep/wake cycle  -  seroquel started     CV:   - Normotensive  - Continue non-invasive hemodynamic monitoring    Pulm:   - Room air  - Encourage IS 10 times per hour  - Pulmonary toilet  - may need additional rib block, pain management following    GI/Nutrition:   - DASH diet  - Monitor bowel movements    /Renal:   - Voiding spontaneously  - Monitor kidney fxn    ID:  - No leukocytosis  - Afebrile    Endo:  - Euglycemic    Skin:   - Repositioning for DTI prevention while in bed    Heme/DVT Prophylaxis:  - SCDs  - SQH    Lines:  - Peripheral IV's    Dispo:  - PT/OT  - Blanca jeffery   
ASSESSMENT: 82y Male M w/ PMH of a fib (on ASA only), HTN, HLD, NSVT, CAD, dementia presenting from home with neck pain and L rib pain s/p fall found to have Left posterior 7-9 rib fxs and Left Anterior 6-9 rib fxs.     Plan:    Neuro:   - pic protocol...pic 8-9 currently  - multi-modal pain regimen. Minimize narcotics  - Delirium precautions  - Optimize sleep/wake cycle    CV:   - Normotensive  - Continue non-invasive hemodynamic monitoring  - Maintain MAP > 65  - Will restart ASA today if no delayed MARCO seen on AM CXR    Pulm:   - Room air  - Encourage IS 10 times per hour  - Pulmonary toilet    GI/Nutrition:   - DASH diet  - Monitor bowel movements    /Renal:   - Voiding spontaneously  - Monitor kidney fxn    ID:  - No leukocytosis  - Afebrile    Endo:  - Euglycemic    Skin:   - Repositioning for DTI prevention while in bed    Heme/DVT Prophylaxis:  - SCDs  - SQH    Lines:  - Peripheral IV's    Dispo:  - PT/OT  - Likely downgrade to floor today
82M  < from: CT Chest No Cont (06.05.22 @ 14:01) >  Posterior LEFT 7th-9th and   anterolateral LEFT 6th-9th rib  fractures.    < end of copied text >    s/p nerve block 6-6    Pain controlled  Pain service will sign off  Please reconsult as needed 
82y Male M w/ PMH of a fib (on ASA only), HTN, HLD, NSVT, CAD, dementia presenting from home with neck pain and L rib pain s/p fall found to have Left posterior 7-9 rib fxs and Left Anterior 6-9 rib fxs.     Plan:    Neuro:   - pic protocol...pic 8-9 currently  - multi-modal pain regimen. Minimize narcotics  - Delirium precautions  - Optimize sleep/wake cycle    CV:   - Normotensive  - Continue non-invasive hemodynamic monitoring    Pulm:   - Room air  - Encourage IS 10 times per hour  - Pulmonary toilet    GI/Nutrition:   - DASH diet  - Monitor bowel movements    /Renal:   - Voiding spontaneously  - Monitor kidney fxn    ID:  - No leukocytosis  - Afebrile    Endo:  - Euglycemic    Skin:   - Repositioning for DTI prevention while in bed    Heme/DVT Prophylaxis:  - SCDs  - SQH    Lines:  - Peripheral IV's    Dispo:  - PT/OT  - Blanca vs home with assist     
82y Male M w/ PMH of a fib (on ASA only), HTN, HLD, NSVT, CAD, dementia presenting from home with neck pain and L rib pain s/p fall found to have Left posterior 7-9 rib fxs and Left Anterior 6-9 rib fxs.     Plan:    Neuro:   - pic protocol...pic 8-9 currently  - multi-modal pain regimen. Minimize narcotics  - Delirium precautions  - Optimize sleep/wake cycle  - may need to start seroquel    CV:   - Normotensive  - Continue non-invasive hemodynamic monitoring    Pulm:   - Room air  - Encourage IS 10 times per hour  - Pulmonary toilet  - may need additional rib block, pain management following    GI/Nutrition:   - DASH diet  - Monitor bowel movements    /Renal:   - Voiding spontaneously  - Monitor kidney fxn    ID:  - No leukocytosis  - Afebrile    Endo:  - Euglycemic    Skin:   - Repositioning for DTI prevention while in bed    Heme/DVT Prophylaxis:  - SCDs  - SQH    Lines:  - Peripheral IV's    Dispo:  - PT/OT  - Bl[facility] jeffery

## 2022-06-23 ENCOUNTER — APPOINTMENT (OUTPATIENT)
Dept: TRAUMA SURGERY | Facility: CLINIC | Age: 82
End: 2022-06-23
Payer: MEDICARE

## 2022-06-23 VITALS
RESPIRATION RATE: 16 BRPM | DIASTOLIC BLOOD PRESSURE: 76 MMHG | SYSTOLIC BLOOD PRESSURE: 113 MMHG | HEART RATE: 62 BPM | TEMPERATURE: 97.3 F | OXYGEN SATURATION: 95 %

## 2022-06-23 DIAGNOSIS — Z91.81 HISTORY OF FALLING: ICD-10-CM

## 2022-06-23 DIAGNOSIS — S22.49XA MULTIPLE FRACTURES OF RIBS, UNSPECIFIED SIDE, INITIAL ENCOUNTER FOR CLOSED FRACTURE: ICD-10-CM

## 2022-06-23 PROCEDURE — 99213 OFFICE O/P EST LOW 20 MIN: CPT

## 2022-06-24 PROBLEM — S22.49XA FRACTURE OF RIBS, MULTIPLE: Status: ACTIVE | Noted: 2022-06-24

## 2022-06-24 PROBLEM — Z91.81 STATUS POST FALL: Status: ACTIVE | Noted: 2022-06-24

## 2022-06-24 NOTE — HISTORY OF PRESENT ILLNESS
[FreeTextEntry1] : Patient presents  to [facility]  for  recheck s/p hopsitalization  Patient sustained fall at a friends home sustaining multiple rib fractures  left 7-9 and 6-9   Patient presently at [facility] for rehab  otherwise lives at  home    Son at bedside  for  entire  exam  Patient  can ask questions but evident he has dementia when he get confused Calm during entire exam  Denies any acute pain Patient multiple times expressed his need to go home and  leave  [facility]

## 2022-06-24 NOTE — REASON FOR VISIT
[Post Hospitalization] : a post hospitalization visit [Family Member] : family member [Other: _____] : [unfilled]

## 2022-06-24 NOTE — ASSESSMENT
[FreeTextEntry1] : RTC prn \par Fall risk\par continue present medical  management\par Discussion with patient/son   All questions answered  Any acute symptoms and or concerns patient understands  to call back office  and  or  go  directly to [facility] ED\par  Time spent with patient 25 minutes

## 2022-06-24 NOTE — PHYSICAL EXAM
[Normal Breath Sounds] : Normal breath sounds [Normal Heart Sounds] : normal heart sounds [Purpura] : purpura [Alert] : alert [Calm] : calm [de-identified] : wdwn  male  in  nad [de-identified] : non icteric sclera PERRLA EOMS intact  and  nl [de-identified] : trachea  midline [de-identified] : nl breath sounds b/l throughout  lung fileds  no  retractions  no  wheezing  nl speech   no palpable crepitus no stepoff   eccymosis  to  left flank area  non tender to palpation  skin intact no  abrasions [de-identified] : soft  no guarding [de-identified] : left lower back  non tender skin intact

## 2022-07-10 ENCOUNTER — INPATIENT (INPATIENT)
Facility: HOSPITAL | Age: 82
LOS: 3 days | Discharge: NURSING, REHAB OR LONG-TERM CARE | DRG: 184 | End: 2022-07-14
Attending: SURGERY | Admitting: SURGERY
Payer: MEDICARE

## 2022-07-10 VITALS
SYSTOLIC BLOOD PRESSURE: 106 MMHG | HEIGHT: 70 IN | HEART RATE: 81 BPM | WEIGHT: 190.04 LBS | DIASTOLIC BLOOD PRESSURE: 71 MMHG | OXYGEN SATURATION: 96 % | RESPIRATION RATE: 20 BRPM | TEMPERATURE: 98 F

## 2022-07-10 DIAGNOSIS — Z98.89 OTHER SPECIFIED POSTPROCEDURAL STATES: Chronic | ICD-10-CM

## 2022-07-10 LAB
ALBUMIN SERPL ELPH-MCNC: 3.8 G/DL — SIGNIFICANT CHANGE UP (ref 3.3–5.2)
ALP SERPL-CCNC: 116 U/L — SIGNIFICANT CHANGE UP (ref 40–120)
ALT FLD-CCNC: 13 U/L — SIGNIFICANT CHANGE UP
ANION GAP SERPL CALC-SCNC: 10 MMOL/L — SIGNIFICANT CHANGE UP (ref 5–17)
AST SERPL-CCNC: 15 U/L — SIGNIFICANT CHANGE UP
BASOPHILS # BLD AUTO: 0.02 K/UL — SIGNIFICANT CHANGE UP (ref 0–0.2)
BASOPHILS NFR BLD AUTO: 0.2 % — SIGNIFICANT CHANGE UP (ref 0–2)
BILIRUB SERPL-MCNC: 0.5 MG/DL — SIGNIFICANT CHANGE UP (ref 0.4–2)
BUN SERPL-MCNC: 35.1 MG/DL — HIGH (ref 8–20)
CALCIUM SERPL-MCNC: 8.9 MG/DL — SIGNIFICANT CHANGE UP (ref 8.6–10.2)
CHLORIDE SERPL-SCNC: 99 MMOL/L — SIGNIFICANT CHANGE UP (ref 98–107)
CK SERPL-CCNC: 63 U/L — SIGNIFICANT CHANGE UP (ref 30–200)
CO2 SERPL-SCNC: 25 MMOL/L — SIGNIFICANT CHANGE UP (ref 22–29)
CREAT SERPL-MCNC: 1.42 MG/DL — HIGH (ref 0.5–1.3)
EGFR: 49 ML/MIN/1.73M2 — LOW
EOSINOPHIL # BLD AUTO: 0.01 K/UL — SIGNIFICANT CHANGE UP (ref 0–0.5)
EOSINOPHIL NFR BLD AUTO: 0.1 % — SIGNIFICANT CHANGE UP (ref 0–6)
GLUCOSE SERPL-MCNC: 145 MG/DL — HIGH (ref 70–99)
HCT VFR BLD CALC: 27.3 % — LOW (ref 39–50)
HGB BLD-MCNC: 9.2 G/DL — LOW (ref 13–17)
IMM GRANULOCYTES NFR BLD AUTO: 0.4 % — SIGNIFICANT CHANGE UP (ref 0–1.5)
LYMPHOCYTES # BLD AUTO: 1.04 K/UL — SIGNIFICANT CHANGE UP (ref 1–3.3)
LYMPHOCYTES # BLD AUTO: 9.2 % — LOW (ref 13–44)
MAGNESIUM SERPL-MCNC: 2.3 MG/DL — SIGNIFICANT CHANGE UP (ref 1.8–2.6)
MCHC RBC-ENTMCNC: 32.2 PG — SIGNIFICANT CHANGE UP (ref 27–34)
MCHC RBC-ENTMCNC: 33.7 GM/DL — SIGNIFICANT CHANGE UP (ref 32–36)
MCV RBC AUTO: 95.5 FL — SIGNIFICANT CHANGE UP (ref 80–100)
MONOCYTES # BLD AUTO: 0.91 K/UL — HIGH (ref 0–0.9)
MONOCYTES NFR BLD AUTO: 8.1 % — SIGNIFICANT CHANGE UP (ref 2–14)
NEUTROPHILS # BLD AUTO: 9.24 K/UL — HIGH (ref 1.8–7.4)
NEUTROPHILS NFR BLD AUTO: 82 % — HIGH (ref 43–77)
PLATELET # BLD AUTO: 173 K/UL — SIGNIFICANT CHANGE UP (ref 150–400)
POTASSIUM SERPL-MCNC: 4.9 MMOL/L — SIGNIFICANT CHANGE UP (ref 3.5–5.3)
POTASSIUM SERPL-SCNC: 4.9 MMOL/L — SIGNIFICANT CHANGE UP (ref 3.5–5.3)
PROT SERPL-MCNC: 6.4 G/DL — LOW (ref 6.6–8.7)
RBC # BLD: 2.86 M/UL — LOW (ref 4.2–5.8)
RBC # FLD: 13.9 % — SIGNIFICANT CHANGE UP (ref 10.3–14.5)
SODIUM SERPL-SCNC: 134 MMOL/L — LOW (ref 135–145)
TROPONIN T SERPL-MCNC: <0.01 NG/ML — SIGNIFICANT CHANGE UP (ref 0–0.06)
WBC # BLD: 11.27 K/UL — HIGH (ref 3.8–10.5)
WBC # FLD AUTO: 11.27 K/UL — HIGH (ref 3.8–10.5)

## 2022-07-10 PROCEDURE — 93010 ELECTROCARDIOGRAM REPORT: CPT

## 2022-07-10 PROCEDURE — 99285 EMERGENCY DEPT VISIT HI MDM: CPT | Mod: GC

## 2022-07-10 PROCEDURE — 99284 EMERGENCY DEPT VISIT MOD MDM: CPT | Mod: GC

## 2022-07-10 NOTE — ED PROVIDER NOTE - CLINICAL SUMMARY MEDICAL DECISION MAKING FREE TEXT BOX
81 y/o M hx of L sided rib fractures (admitted to SICU in 6/2022), dementia, HTN brought in from [facility] for fall. per patient, 3 falls today. received 500cc fluid bolus prior to arrival.   +large dark purple colored ecchymosis over L flank. ecchymosis over L shoulder. abrasion over L knee.   will get collateral from facility. well appearing, vital signs stable. no acute pain/tenderness. moving all extremities. A&Ox4 currently.

## 2022-07-10 NOTE — ED ADULT TRIAGE NOTE - CHIEF COMPLAINT QUOTE
BIBEMS from [facility] for syncope and 4 falls today.  Pt reports feeling lightheaded today and had one syncopal episode.  Also had 3 additional falls. Denies hitting head. No use of blood thinners.  Abrasion noted to left knee. C/O left hand and arm pain. Able to move extremity some swelling noted to hand. Old large ecchymotic area noted to left side of torso from old falls. As per EMS facility RN report pt to be hypotensive following fall with an SBP in the 80s, received 500cc bolus prior to arrival. VS ENL at this time.  Hx of dementia.

## 2022-07-10 NOTE — ED PROVIDER NOTE - OBJECTIVE STATEMENT
81 y/o M hx of L sided rib fractures (admitted to SICU in 6/2022), dementia, HTN brought in from [facility] for fall. per patient, 3 falls 83 y/o M hx of L sided rib fractures (admitted to SICU in 6/2022), dementia, HTN brought in from [facility] nursing for fall. received 500 cc fluid bolus prior to arrival for hypotension. per patient, 3 falls today. states last fall was him attempting to roll out of bed. not on blood thinners. no LOC. not endorsing any acute pain currently. ambulates w/o assistance normally. will get collateral due to dementia history. sister at bedside. 81 y/o M hx of L sided rib fractures (admitted to SICU in 6/2022), dementia, HTN, NSVT on amiodarone, CAD, MI, brought in from [facility] for fall. received 500 cc fluid bolus prior to arrival for hypotension. per patient, 3 falls today. states last fall was him attempting to roll out of bed. not on blood thinners. no LOC. not endorsing any acute pain currently. ambulates w/o assistance normally. will get collateral due to dementia history. sister at bedside.

## 2022-07-10 NOTE — ED ADULT NURSE NOTE - CHIEF COMPLAINT QUOTE
BIBEMS from [facility] for syncope and 4 falls today.  Pt reports feeling lightheaded today and had one syncopal episode.  Also had 3 additional falls. Denies hitting head. No use of blood thinners.  Abrasion noted to left knee. C/O left hand and arm pain. Able to move extremity some swelling noted to hand. Old large ecchymotic area noted to left side of torso from old falls. As per EMS facility RN report pt to be hypotensive following fall with an SBP in the 80s, received 500cc bolus prior to arrival. VS ENL at this time.  Hx of dementia.

## 2022-07-10 NOTE — ED ADULT NURSE NOTE - OBJECTIVE STATEMENT
Assumed care of pt at 21:15. Pt A&Ox3 c/o having multiple falls since being d/c'd from [facility]. Lives on his own and had a fall in June which he was at [facility] for then went to [facility], was d/c'd friday and has had multiple (pt states 3) falls since being home. Had a hard fall today and pressed an emergency button for police to arrive. Pt resting comfortably on stretcher at this time, diffuse ecchymosis left rib from June fall, new ecchymosis on back from recent falls. abdomen soft non tender respirations equal and unlabored. MD Henry at bedside with patient.

## 2022-07-10 NOTE — ED PROVIDER NOTE - PHYSICAL EXAMINATION
General: Well appearing male in no acute distress  HEENT: Normocephalic, atraumatic. Moist mucous membranes. Oropharynx clear. No lymphadenopathy.  Eyes: No scleral icterus. EOMI. ANNEMARIE.  Neck:. Soft and supple. Full ROM without pain. No midline tenderness  Cardiac: Regular rate and regular rhythm. No murmurs, rubs, gallops. Peripheral pulses 2+ and symmetric. No LE edema.  Resp: Lungs CTAB. Speaking in full sentences. No wheezes, rales or rhonchi.  Abd: Soft, non-tender, non-distended. No guarding or rebound. No scars, masses, or lesions.  Back: Spine midline and non-tender. No CVA tenderness.    Skin: +large dark purple colored ecchymosis over L flank. ecchymosis over L shoulder. abrasion over L knee.   Neuro: AO x 3. Moves all extremities symmetrically. Motor strength and sensation grossly intact.

## 2022-07-10 NOTE — ED ADULT NURSE NOTE - NSIMPLEMENTINTERV_GEN_ALL_ED
Implemented All Fall Risk Interventions:  [place] to call system. Call bell, personal items and telephone within reach. Instruct patient to call for assistance. Room bathroom lighting operational. Non-slip footwear when patient is off stretcher. Physically safe environment: no spills, clutter or unnecessary equipment. Stretcher in lowest position, wheels locked, appropriate side rails in place. Provide visual cue, wrist band, yellow gown, etc. Monitor gait and stability. Monitor for mental status changes and reorient to person, place, and time. Review medications for side effects contributing to fall risk. Reinforce activity limits and safety measures with patient and family.

## 2022-07-10 NOTE — ED PROVIDER NOTE - PROGRESS NOTE DETAILS
collateral : fell 4 times today, baseline confused, self ambulates. found on floor twice earlier today. awake when he was found, unsure of hit head. unwitnessed falls. patient has been there for 1 month for rib fractures. states echcymosis on his body is old, not new in nature. spoke to ANTHONY kaufman at facility who is familiar with patient.

## 2022-07-10 NOTE — ED PROVIDER NOTE - ATTENDING CONTRIBUTION TO CARE
81 yo male sent in from Nursing home secondary to unwitnessed fall and found to have some chest wall tenderness without obvious acute injury.   I personally saw the patient with the resident, and completed the key components of the history and physical exam. I then discussed the management plan with the resident.

## 2022-07-11 DIAGNOSIS — S22.49XA MULTIPLE FRACTURES OF RIBS, UNSPECIFIED SIDE, INITIAL ENCOUNTER FOR CLOSED FRACTURE: ICD-10-CM

## 2022-07-11 LAB
ANION GAP SERPL CALC-SCNC: 9 MMOL/L — SIGNIFICANT CHANGE UP (ref 5–17)
APTT BLD: 32 SEC — SIGNIFICANT CHANGE UP (ref 27.5–35.5)
APTT BLD: 33.5 SEC — SIGNIFICANT CHANGE UP (ref 27.5–35.5)
BASOPHILS # BLD AUTO: 0.03 K/UL — SIGNIFICANT CHANGE UP (ref 0–0.2)
BASOPHILS # BLD AUTO: 0.04 K/UL — SIGNIFICANT CHANGE UP (ref 0–0.2)
BASOPHILS NFR BLD AUTO: 0.3 % — SIGNIFICANT CHANGE UP (ref 0–2)
BASOPHILS NFR BLD AUTO: 0.4 % — SIGNIFICANT CHANGE UP (ref 0–2)
BLD GP AB SCN SERPL QL: SIGNIFICANT CHANGE UP
BUN SERPL-MCNC: 28.1 MG/DL — HIGH (ref 8–20)
CALCIUM SERPL-MCNC: 8.3 MG/DL — LOW (ref 8.6–10.2)
CHLORIDE SERPL-SCNC: 100 MMOL/L — SIGNIFICANT CHANGE UP (ref 98–107)
CO2 SERPL-SCNC: 26 MMOL/L — SIGNIFICANT CHANGE UP (ref 22–29)
CREAT SERPL-MCNC: 0.98 MG/DL — SIGNIFICANT CHANGE UP (ref 0.5–1.3)
EGFR: 77 ML/MIN/1.73M2 — SIGNIFICANT CHANGE UP
EOSINOPHIL # BLD AUTO: 0.05 K/UL — SIGNIFICANT CHANGE UP (ref 0–0.5)
EOSINOPHIL # BLD AUTO: 0.06 K/UL — SIGNIFICANT CHANGE UP (ref 0–0.5)
EOSINOPHIL NFR BLD AUTO: 0.6 % — SIGNIFICANT CHANGE UP (ref 0–6)
EOSINOPHIL NFR BLD AUTO: 0.6 % — SIGNIFICANT CHANGE UP (ref 0–6)
GLUCOSE SERPL-MCNC: 110 MG/DL — HIGH (ref 70–99)
HCT VFR BLD CALC: 24.9 % — LOW (ref 39–50)
HCT VFR BLD CALC: 26.5 % — LOW (ref 39–50)
HGB BLD-MCNC: 8.3 G/DL — LOW (ref 13–17)
HGB BLD-MCNC: 8.9 G/DL — LOW (ref 13–17)
IMM GRANULOCYTES NFR BLD AUTO: 0.5 % — SIGNIFICANT CHANGE UP (ref 0–1.5)
IMM GRANULOCYTES NFR BLD AUTO: 0.6 % — SIGNIFICANT CHANGE UP (ref 0–1.5)
INR BLD: 1.16 RATIO — SIGNIFICANT CHANGE UP (ref 0.88–1.16)
INR BLD: 1.17 RATIO — HIGH (ref 0.88–1.16)
LYMPHOCYTES # BLD AUTO: 1.25 K/UL — SIGNIFICANT CHANGE UP (ref 1–3.3)
LYMPHOCYTES # BLD AUTO: 1.39 K/UL — SIGNIFICANT CHANGE UP (ref 1–3.3)
LYMPHOCYTES # BLD AUTO: 14.1 % — SIGNIFICANT CHANGE UP (ref 13–44)
LYMPHOCYTES # BLD AUTO: 14.9 % — SIGNIFICANT CHANGE UP (ref 13–44)
MAGNESIUM SERPL-MCNC: 2.3 MG/DL — SIGNIFICANT CHANGE UP (ref 1.6–2.6)
MCHC RBC-ENTMCNC: 32.4 PG — SIGNIFICANT CHANGE UP (ref 27–34)
MCHC RBC-ENTMCNC: 32.7 PG — SIGNIFICANT CHANGE UP (ref 27–34)
MCHC RBC-ENTMCNC: 33.3 GM/DL — SIGNIFICANT CHANGE UP (ref 32–36)
MCHC RBC-ENTMCNC: 33.6 GM/DL — SIGNIFICANT CHANGE UP (ref 32–36)
MCV RBC AUTO: 97.3 FL — SIGNIFICANT CHANGE UP (ref 80–100)
MCV RBC AUTO: 97.4 FL — SIGNIFICANT CHANGE UP (ref 80–100)
MONOCYTES # BLD AUTO: 0.91 K/UL — HIGH (ref 0–0.9)
MONOCYTES # BLD AUTO: 0.95 K/UL — HIGH (ref 0–0.9)
MONOCYTES NFR BLD AUTO: 10.7 % — SIGNIFICANT CHANGE UP (ref 2–14)
MONOCYTES NFR BLD AUTO: 9.7 % — SIGNIFICANT CHANGE UP (ref 2–14)
MRSA PCR RESULT.: SIGNIFICANT CHANGE UP
NEUTROPHILS # BLD AUTO: 6.54 K/UL — SIGNIFICANT CHANGE UP (ref 1.8–7.4)
NEUTROPHILS # BLD AUTO: 6.91 K/UL — SIGNIFICANT CHANGE UP (ref 1.8–7.4)
NEUTROPHILS NFR BLD AUTO: 73.7 % — SIGNIFICANT CHANGE UP (ref 43–77)
NEUTROPHILS NFR BLD AUTO: 73.9 % — SIGNIFICANT CHANGE UP (ref 43–77)
PHOSPHATE SERPL-MCNC: 2.7 MG/DL — SIGNIFICANT CHANGE UP (ref 2.4–4.7)
PLATELET # BLD AUTO: 170 K/UL — SIGNIFICANT CHANGE UP (ref 150–400)
PLATELET # BLD AUTO: 190 K/UL — SIGNIFICANT CHANGE UP (ref 150–400)
POTASSIUM SERPL-MCNC: 4.1 MMOL/L — SIGNIFICANT CHANGE UP (ref 3.5–5.3)
POTASSIUM SERPL-SCNC: 4.1 MMOL/L — SIGNIFICANT CHANGE UP (ref 3.5–5.3)
PROTHROM AB SERPL-ACNC: 13.5 SEC — HIGH (ref 10.5–13.4)
PROTHROM AB SERPL-ACNC: 13.6 SEC — HIGH (ref 10.5–13.4)
RBC # BLD: 2.56 M/UL — LOW (ref 4.2–5.8)
RBC # BLD: 2.72 M/UL — LOW (ref 4.2–5.8)
RBC # FLD: 14.2 % — SIGNIFICANT CHANGE UP (ref 10.3–14.5)
RBC # FLD: 14.2 % — SIGNIFICANT CHANGE UP (ref 10.3–14.5)
S AUREUS DNA NOSE QL NAA+PROBE: SIGNIFICANT CHANGE UP
SARS-COV-2 RNA SPEC QL NAA+PROBE: SIGNIFICANT CHANGE UP
SODIUM SERPL-SCNC: 135 MMOL/L — SIGNIFICANT CHANGE UP (ref 135–145)
TROPONIN T SERPL-MCNC: <0.01 NG/ML — SIGNIFICANT CHANGE UP (ref 0–0.06)
WBC # BLD: 8.87 K/UL — SIGNIFICANT CHANGE UP (ref 3.8–10.5)
WBC # BLD: 9.36 K/UL — SIGNIFICANT CHANGE UP (ref 3.8–10.5)
WBC # FLD AUTO: 8.87 K/UL — SIGNIFICANT CHANGE UP (ref 3.8–10.5)
WBC # FLD AUTO: 9.36 K/UL — SIGNIFICANT CHANGE UP (ref 3.8–10.5)

## 2022-07-11 PROCEDURE — 72125 CT NECK SPINE W/O DYE: CPT | Mod: 26,MG

## 2022-07-11 PROCEDURE — 71045 X-RAY EXAM CHEST 1 VIEW: CPT | Mod: 26

## 2022-07-11 PROCEDURE — 99222 1ST HOSP IP/OBS MODERATE 55: CPT

## 2022-07-11 PROCEDURE — 70450 CT HEAD/BRAIN W/O DYE: CPT | Mod: 26,MG

## 2022-07-11 PROCEDURE — G1004: CPT

## 2022-07-11 PROCEDURE — 74177 CT ABD & PELVIS W/CONTRAST: CPT | Mod: 26,MG

## 2022-07-11 PROCEDURE — 99291 CRITICAL CARE FIRST HOUR: CPT

## 2022-07-11 PROCEDURE — 71260 CT THORAX DX C+: CPT | Mod: 26,MG

## 2022-07-11 RX ORDER — TAMSULOSIN HYDROCHLORIDE 0.4 MG/1
0.4 CAPSULE ORAL AT BEDTIME
Refills: 0 | Status: DISCONTINUED | OUTPATIENT
Start: 2022-07-11 | End: 2022-07-14

## 2022-07-11 RX ORDER — OXYCODONE HYDROCHLORIDE 5 MG/1
10 TABLET ORAL EVERY 6 HOURS
Refills: 0 | Status: DISCONTINUED | OUTPATIENT
Start: 2022-07-11 | End: 2022-07-11

## 2022-07-11 RX ORDER — SODIUM,POTASSIUM PHOSPHATES 278-250MG
1 POWDER IN PACKET (EA) ORAL ONCE
Refills: 0 | Status: COMPLETED | OUTPATIENT
Start: 2022-07-11 | End: 2022-07-11

## 2022-07-11 RX ORDER — MEMANTINE HYDROCHLORIDE 10 MG/1
10 TABLET ORAL DAILY
Refills: 0 | Status: DISCONTINUED | OUTPATIENT
Start: 2022-07-11 | End: 2022-07-12

## 2022-07-11 RX ORDER — SENNA PLUS 8.6 MG/1
2 TABLET ORAL AT BEDTIME
Refills: 0 | Status: DISCONTINUED | OUTPATIENT
Start: 2022-07-11 | End: 2022-07-14

## 2022-07-11 RX ORDER — OXYCODONE HYDROCHLORIDE 5 MG/1
5 TABLET ORAL EVERY 6 HOURS
Refills: 0 | Status: DISCONTINUED | OUTPATIENT
Start: 2022-07-11 | End: 2022-07-14

## 2022-07-11 RX ORDER — SERTRALINE 25 MG/1
50 TABLET, FILM COATED ORAL DAILY
Refills: 0 | Status: DISCONTINUED | OUTPATIENT
Start: 2022-07-11 | End: 2022-07-14

## 2022-07-11 RX ORDER — LANOLIN ALCOHOL/MO/W.PET/CERES
5 CREAM (GRAM) TOPICAL AT BEDTIME
Refills: 0 | Status: DISCONTINUED | OUTPATIENT
Start: 2022-07-11 | End: 2022-07-14

## 2022-07-11 RX ORDER — ALPRAZOLAM 0.25 MG
0.25 TABLET ORAL
Refills: 0 | Status: DISCONTINUED | OUTPATIENT
Start: 2022-07-11 | End: 2022-07-12

## 2022-07-11 RX ORDER — DONEPEZIL HYDROCHLORIDE 10 MG/1
10 TABLET, FILM COATED ORAL AT BEDTIME
Refills: 0 | Status: DISCONTINUED | OUTPATIENT
Start: 2022-07-11 | End: 2022-07-14

## 2022-07-11 RX ORDER — ACETAMINOPHEN 500 MG
975 TABLET ORAL EVERY 6 HOURS
Refills: 0 | Status: DISCONTINUED | OUTPATIENT
Start: 2022-07-11 | End: 2022-07-14

## 2022-07-11 RX ORDER — METHOCARBAMOL 500 MG/1
500 TABLET, FILM COATED ORAL
Refills: 0 | Status: DISCONTINUED | OUTPATIENT
Start: 2022-07-11 | End: 2022-07-14

## 2022-07-11 RX ORDER — OXYCODONE HYDROCHLORIDE 5 MG/1
5 TABLET ORAL EVERY 6 HOURS
Refills: 0 | Status: DISCONTINUED | OUTPATIENT
Start: 2022-07-11 | End: 2022-07-11

## 2022-07-11 RX ORDER — LIDOCAINE 4 G/100G
1 CREAM TOPICAL EVERY 24 HOURS
Refills: 0 | Status: DISCONTINUED | OUTPATIENT
Start: 2022-07-11 | End: 2022-07-14

## 2022-07-11 RX ORDER — SODIUM CHLORIDE 9 MG/ML
1000 INJECTION INTRAMUSCULAR; INTRAVENOUS; SUBCUTANEOUS ONCE
Refills: 0 | Status: COMPLETED | OUTPATIENT
Start: 2022-07-11 | End: 2022-07-11

## 2022-07-11 RX ORDER — METOPROLOL TARTRATE 50 MG
25 TABLET ORAL
Refills: 0 | Status: DISCONTINUED | OUTPATIENT
Start: 2022-07-11 | End: 2022-07-14

## 2022-07-11 RX ORDER — CHLORHEXIDINE GLUCONATE 213 G/1000ML
1 SOLUTION TOPICAL DAILY
Refills: 0 | Status: DISCONTINUED | OUTPATIENT
Start: 2022-07-11 | End: 2022-07-12

## 2022-07-11 RX ORDER — HALOPERIDOL DECANOATE 100 MG/ML
2.5 INJECTION INTRAMUSCULAR ONCE
Refills: 0 | Status: COMPLETED | OUTPATIENT
Start: 2022-07-11 | End: 2022-07-12

## 2022-07-11 RX ORDER — METOPROLOL TARTRATE 50 MG
50 TABLET ORAL DAILY
Refills: 0 | Status: DISCONTINUED | OUTPATIENT
Start: 2022-07-11 | End: 2022-07-11

## 2022-07-11 RX ORDER — ALPRAZOLAM 0.25 MG
0.25 TABLET ORAL
Refills: 0 | Status: DISCONTINUED | OUTPATIENT
Start: 2022-07-11 | End: 2022-07-11

## 2022-07-11 RX ORDER — SIMVASTATIN 20 MG/1
20 TABLET, FILM COATED ORAL AT BEDTIME
Refills: 0 | Status: DISCONTINUED | OUTPATIENT
Start: 2022-07-11 | End: 2022-07-14

## 2022-07-11 RX ORDER — OXYCODONE HYDROCHLORIDE 5 MG/1
2.5 TABLET ORAL EVERY 6 HOURS
Refills: 0 | Status: DISCONTINUED | OUTPATIENT
Start: 2022-07-11 | End: 2022-07-14

## 2022-07-11 RX ORDER — AMIODARONE HYDROCHLORIDE 400 MG/1
200 TABLET ORAL DAILY
Refills: 0 | Status: DISCONTINUED | OUTPATIENT
Start: 2022-07-11 | End: 2022-07-14

## 2022-07-11 RX ADMIN — SIMVASTATIN 20 MILLIGRAM(S): 20 TABLET, FILM COATED ORAL at 21:59

## 2022-07-11 RX ADMIN — DONEPEZIL HYDROCHLORIDE 10 MILLIGRAM(S): 10 TABLET, FILM COATED ORAL at 21:59

## 2022-07-11 RX ADMIN — Medication 975 MILLIGRAM(S): at 18:08

## 2022-07-11 RX ADMIN — CHLORHEXIDINE GLUCONATE 1 APPLICATION(S): 213 SOLUTION TOPICAL at 14:16

## 2022-07-11 RX ADMIN — SERTRALINE 50 MILLIGRAM(S): 25 TABLET, FILM COATED ORAL at 14:15

## 2022-07-11 RX ADMIN — METHOCARBAMOL 500 MILLIGRAM(S): 500 TABLET, FILM COATED ORAL at 05:45

## 2022-07-11 RX ADMIN — LIDOCAINE 1 PATCH: 4 CREAM TOPICAL at 05:46

## 2022-07-11 RX ADMIN — Medication 975 MILLIGRAM(S): at 05:45

## 2022-07-11 RX ADMIN — OXYCODONE HYDROCHLORIDE 5 MILLIGRAM(S): 5 TABLET ORAL at 05:45

## 2022-07-11 RX ADMIN — METHOCARBAMOL 500 MILLIGRAM(S): 500 TABLET, FILM COATED ORAL at 17:42

## 2022-07-11 RX ADMIN — Medication 25 MILLIGRAM(S): at 17:42

## 2022-07-11 RX ADMIN — LIDOCAINE 1 PATCH: 4 CREAM TOPICAL at 11:49

## 2022-07-11 RX ADMIN — AMIODARONE HYDROCHLORIDE 200 MILLIGRAM(S): 400 TABLET ORAL at 09:06

## 2022-07-11 RX ADMIN — Medication 50 MILLIGRAM(S): at 05:45

## 2022-07-11 RX ADMIN — MEMANTINE HYDROCHLORIDE 10 MILLIGRAM(S): 10 TABLET ORAL at 14:15

## 2022-07-11 RX ADMIN — Medication 0.25 MILLIGRAM(S): at 17:41

## 2022-07-11 RX ADMIN — LIDOCAINE 1 PATCH: 4 CREAM TOPICAL at 17:51

## 2022-07-11 RX ADMIN — SODIUM CHLORIDE 1000 MILLILITER(S): 9 INJECTION INTRAMUSCULAR; INTRAVENOUS; SUBCUTANEOUS at 07:32

## 2022-07-11 RX ADMIN — Medication 1 PACKET(S): at 09:06

## 2022-07-11 RX ADMIN — Medication 975 MILLIGRAM(S): at 17:38

## 2022-07-11 RX ADMIN — SENNA PLUS 2 TABLET(S): 8.6 TABLET ORAL at 21:59

## 2022-07-11 RX ADMIN — Medication 975 MILLIGRAM(S): at 14:16

## 2022-07-11 RX ADMIN — TAMSULOSIN HYDROCHLORIDE 0.4 MILLIGRAM(S): 0.4 CAPSULE ORAL at 21:59

## 2022-07-11 RX ADMIN — Medication 975 MILLIGRAM(S): at 15:46

## 2022-07-11 NOTE — H&P ADULT - NSHPLABSRESULTS_GEN_ALL_CORE
LABS:                        9.2    11.27 )-----------( 173      ( 10 Jul 2022 22:44 )             27.3     07-10    134<L>  |  99  |  35.1<H>  ----------------------------<  145<H>  4.9   |  25.0  |  1.42<H>    Ca    8.9      10 Jul 2022 22:44  Mg     2.3     07-10    TPro  6.4<L>  /  Alb  3.8  /  TBili  0.5  /  DBili  x   /  AST  15  /  ALT  13  /  AlkPhos  116  07-10    Lactate:        CARDIAC MARKERS ( 10 Jul 2022 22:44 )  x     / <0.01 ng/mL / 63 U/L / x     / x        < from: CT Abdomen and Pelvis w/ IV Cont (07.11.22 @ 01:55) >    < from: CT Abdomen and Pelvis w/ IV Cont (07.11.22 @ 01:55) >    IMPRESSION:    Trace left pleural effusion with adjacent compressive atelectasis,   raising concern for hemothorax. No visible large pneumothorax.    Redemonstrated left posterior 7th-9th and anterolateral left 6th-9th rib   fractures. Interval displacement and overriding of left posterolateral   seventh and ninth ribs, reinjury to the left ribs considered.    Interval large left chest wall hematoma measuring 19 x 9 x 5.5 cm (CC x   AP x TV) containing low areas of attenuation centrally. Subcutaneous   edema and stranding within left flank region.    No visceral organ injury in the abdomen or pelvis.    < end of copied text >    < from: CT Head No Cont (07.11.22 @ 01:54) >    Head CT: No displaced calvarial fracture or acute intracranial hemorrhage.    Cervical spine CT: No acute fracture. Left supraclavicular and axillary   soft tissue contusion. Please refer to CT of chest for detailed findings.    < end of copied text >                    IMAGING:
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
## 2022-07-11 NOTE — H&P ADULT - ASSESSMENT
82 yo  M with extensive cardiac hx who presents to the ED s/p mechanical GLF, unwitnessed, ? LOC, on ASA. Injury complex demonstrates  left posterior 7th-9th and anterolateral left 6th-9th rib fractures. Interval displacement (from previous fx secondary to a fall on 6/5), and large chest wall hematoma  measuring 19 x 9 x 5.5 cm. No evidence of hard vascular signs and/or active extravasation. PIC score 9 (3/3/3).     Plan:   - Admit to trauma observation - Dr. Wise   - PIC protocol   - H/H q6h   - Compression of L hematoma   - Pain control MMD  - Monitor on tele   - CARDS consultation to r/o syncope   - Resume home medications   - Hold DVT ppx    Plan discussed with on call Trauma Surgery Attending.

## 2022-07-11 NOTE — H&P ADULT - NSHPPHYSICALEXAM_GEN_ALL_CORE
VITALS & I/Os:  Vital Signs Last 24 Hrs  T(C): 36.7 (10 Jul 2022 23:16), Max: 36.9 (10 Jul 2022 21:05)  T(F): 98 (10 Jul 2022 23:16), Max: 98.5 (10 Jul 2022 21:05)  HR: 82 (10 Jul 2022 23:16) (81 - 82)  BP: 158/73 (10 Jul 2022 23:16) (106/71 - 158/73)  BP(mean): --  RR: 20 (10 Jul 2022 23:16) (20 - 20)  SpO2: 100% (10 Jul 2022 23:16) (96% - 100%)    Parameters below as of 10 Jul 2022 23:16  Patient On (Oxygen Delivery Method): room air                PHYSICAL EXAM:  Constitutional: patient resting comfortably in bed, in no acute distress  HEENT: EOMI / PERRL b/l, no active drainage or redness  Neck: No JVD, full ROM without pain, no cervical spine tenderness   Respiratory: respirations are unlabored, no accessory muscle use, some pain w/deep inspiration,  no conversational dyspnea, large chest wall hematoma with ecchymosis., non-expanding, non-pulsatile, no hard signs of vascular injury   Cardiovascular: regular rate, irregular rhythm, HDS on tele   Gastrointestinal: Abdomen soft, non-tender, non-distended, no rebound tenderness / guarding  Neurological: GCS: 14 (4/4/6).; no gross sensory / motor / coordination deficits  Psychiatric: Normal mood, normal affect, poor historian   Musculoskeletal: No joint pain, swelling or deformity; no limitation of movement

## 2022-07-11 NOTE — PATIENT PROFILE ADULT - FALL HARM RISK - HARM RISK INTERVENTIONS

## 2022-07-12 LAB
ANION GAP SERPL CALC-SCNC: 9 MMOL/L — SIGNIFICANT CHANGE UP (ref 5–17)
BASOPHILS # BLD AUTO: 0.03 K/UL — SIGNIFICANT CHANGE UP (ref 0–0.2)
BASOPHILS NFR BLD AUTO: 0.3 % — SIGNIFICANT CHANGE UP (ref 0–2)
BUN SERPL-MCNC: 18.7 MG/DL — SIGNIFICANT CHANGE UP (ref 8–20)
CALCIUM SERPL-MCNC: 8.4 MG/DL — LOW (ref 8.6–10.2)
CHLORIDE SERPL-SCNC: 104 MMOL/L — SIGNIFICANT CHANGE UP (ref 98–107)
CO2 SERPL-SCNC: 25 MMOL/L — SIGNIFICANT CHANGE UP (ref 22–29)
CREAT SERPL-MCNC: 0.69 MG/DL — SIGNIFICANT CHANGE UP (ref 0.5–1.3)
EGFR: 92 ML/MIN/1.73M2 — SIGNIFICANT CHANGE UP
EOSINOPHIL # BLD AUTO: 0.11 K/UL — SIGNIFICANT CHANGE UP (ref 0–0.5)
EOSINOPHIL NFR BLD AUTO: 1.2 % — SIGNIFICANT CHANGE UP (ref 0–6)
GLUCOSE SERPL-MCNC: 107 MG/DL — HIGH (ref 70–99)
HCT VFR BLD CALC: 25.2 % — LOW (ref 39–50)
HGB BLD-MCNC: 8.4 G/DL — LOW (ref 13–17)
IMM GRANULOCYTES NFR BLD AUTO: 0.2 % — SIGNIFICANT CHANGE UP (ref 0–1.5)
LYMPHOCYTES # BLD AUTO: 1.47 K/UL — SIGNIFICANT CHANGE UP (ref 1–3.3)
LYMPHOCYTES # BLD AUTO: 16.2 % — SIGNIFICANT CHANGE UP (ref 13–44)
MAGNESIUM SERPL-MCNC: 2.2 MG/DL — SIGNIFICANT CHANGE UP (ref 1.6–2.6)
MCHC RBC-ENTMCNC: 32.1 PG — SIGNIFICANT CHANGE UP (ref 27–34)
MCHC RBC-ENTMCNC: 33.3 GM/DL — SIGNIFICANT CHANGE UP (ref 32–36)
MCV RBC AUTO: 96.2 FL — SIGNIFICANT CHANGE UP (ref 80–100)
MONOCYTES # BLD AUTO: 0.87 K/UL — SIGNIFICANT CHANGE UP (ref 0–0.9)
MONOCYTES NFR BLD AUTO: 9.6 % — SIGNIFICANT CHANGE UP (ref 2–14)
NEUTROPHILS # BLD AUTO: 6.6 K/UL — SIGNIFICANT CHANGE UP (ref 1.8–7.4)
NEUTROPHILS NFR BLD AUTO: 72.5 % — SIGNIFICANT CHANGE UP (ref 43–77)
PHOSPHATE SERPL-MCNC: 2 MG/DL — LOW (ref 2.4–4.7)
PLATELET # BLD AUTO: 169 K/UL — SIGNIFICANT CHANGE UP (ref 150–400)
POTASSIUM SERPL-MCNC: 4.4 MMOL/L — SIGNIFICANT CHANGE UP (ref 3.5–5.3)
POTASSIUM SERPL-SCNC: 4.4 MMOL/L — SIGNIFICANT CHANGE UP (ref 3.5–5.3)
RBC # BLD: 2.62 M/UL — LOW (ref 4.2–5.8)
RBC # FLD: 14 % — SIGNIFICANT CHANGE UP (ref 10.3–14.5)
SODIUM SERPL-SCNC: 138 MMOL/L — SIGNIFICANT CHANGE UP (ref 135–145)
WBC # BLD: 9.1 K/UL — SIGNIFICANT CHANGE UP (ref 3.8–10.5)
WBC # FLD AUTO: 9.1 K/UL — SIGNIFICANT CHANGE UP (ref 3.8–10.5)

## 2022-07-12 PROCEDURE — 71045 X-RAY EXAM CHEST 1 VIEW: CPT | Mod: 26

## 2022-07-12 PROCEDURE — 93010 ELECTROCARDIOGRAM REPORT: CPT

## 2022-07-12 RX ORDER — ENOXAPARIN SODIUM 100 MG/ML
40 INJECTION SUBCUTANEOUS EVERY 24 HOURS
Refills: 0 | Status: DISCONTINUED | OUTPATIENT
Start: 2022-07-12 | End: 2022-07-14

## 2022-07-12 RX ORDER — MEMANTINE HYDROCHLORIDE 10 MG/1
1 TABLET ORAL
Qty: 0 | Refills: 0 | DISCHARGE

## 2022-07-12 RX ORDER — ALPRAZOLAM 0.25 MG
0 TABLET ORAL
Qty: 0 | Refills: 0 | DISCHARGE

## 2022-07-12 RX ORDER — HALOPERIDOL DECANOATE 100 MG/ML
2.5 INJECTION INTRAMUSCULAR ONCE
Refills: 0 | Status: COMPLETED | OUTPATIENT
Start: 2022-07-12 | End: 2022-07-12

## 2022-07-12 RX ORDER — SODIUM,POTASSIUM PHOSPHATES 278-250MG
2 POWDER IN PACKET (EA) ORAL ONCE
Refills: 0 | Status: COMPLETED | OUTPATIENT
Start: 2022-07-12 | End: 2022-07-12

## 2022-07-12 RX ORDER — HALOPERIDOL DECANOATE 100 MG/ML
2.5 INJECTION INTRAMUSCULAR ONCE
Refills: 0 | Status: DISCONTINUED | OUTPATIENT
Start: 2022-07-12 | End: 2022-07-12

## 2022-07-12 RX ORDER — MEMANTINE HYDROCHLORIDE 10 MG/1
10 TABLET ORAL EVERY 12 HOURS
Refills: 0 | Status: DISCONTINUED | OUTPATIENT
Start: 2022-07-12 | End: 2022-07-14

## 2022-07-12 RX ADMIN — DONEPEZIL HYDROCHLORIDE 10 MILLIGRAM(S): 10 TABLET, FILM COATED ORAL at 22:40

## 2022-07-12 RX ADMIN — MEMANTINE HYDROCHLORIDE 10 MILLIGRAM(S): 10 TABLET ORAL at 17:39

## 2022-07-12 RX ADMIN — TAMSULOSIN HYDROCHLORIDE 0.4 MILLIGRAM(S): 0.4 CAPSULE ORAL at 22:45

## 2022-07-12 RX ADMIN — HALOPERIDOL DECANOATE 2.5 MILLIGRAM(S): 100 INJECTION INTRAMUSCULAR at 16:02

## 2022-07-12 RX ADMIN — MEMANTINE HYDROCHLORIDE 10 MILLIGRAM(S): 10 TABLET ORAL at 10:43

## 2022-07-12 RX ADMIN — METHOCARBAMOL 500 MILLIGRAM(S): 500 TABLET, FILM COATED ORAL at 17:37

## 2022-07-12 RX ADMIN — HALOPERIDOL DECANOATE 2.5 MILLIGRAM(S): 100 INJECTION INTRAMUSCULAR at 00:03

## 2022-07-12 RX ADMIN — METHOCARBAMOL 500 MILLIGRAM(S): 500 TABLET, FILM COATED ORAL at 06:25

## 2022-07-12 RX ADMIN — SIMVASTATIN 20 MILLIGRAM(S): 20 TABLET, FILM COATED ORAL at 22:39

## 2022-07-12 RX ADMIN — SENNA PLUS 2 TABLET(S): 8.6 TABLET ORAL at 22:40

## 2022-07-12 RX ADMIN — SERTRALINE 50 MILLIGRAM(S): 25 TABLET, FILM COATED ORAL at 10:43

## 2022-07-12 RX ADMIN — Medication 5 MILLIGRAM(S): at 22:41

## 2022-07-12 RX ADMIN — LIDOCAINE 1 PATCH: 4 CREAM TOPICAL at 05:53

## 2022-07-12 RX ADMIN — AMIODARONE HYDROCHLORIDE 200 MILLIGRAM(S): 400 TABLET ORAL at 06:16

## 2022-07-12 RX ADMIN — LIDOCAINE 1 PATCH: 4 CREAM TOPICAL at 10:43

## 2022-07-12 RX ADMIN — Medication 25 MILLIGRAM(S): at 06:15

## 2022-07-12 RX ADMIN — Medication 975 MILLIGRAM(S): at 17:39

## 2022-07-12 RX ADMIN — Medication 975 MILLIGRAM(S): at 10:04

## 2022-07-12 RX ADMIN — Medication 2 PACKET(S): at 06:15

## 2022-07-12 RX ADMIN — ENOXAPARIN SODIUM 40 MILLIGRAM(S): 100 INJECTION SUBCUTANEOUS at 10:03

## 2022-07-12 RX ADMIN — HALOPERIDOL DECANOATE 2.5 MILLIGRAM(S): 100 INJECTION INTRAMUSCULAR at 00:48

## 2022-07-12 NOTE — PHYSICAL THERAPY INITIAL EVALUATION ADULT - CRITERIA FOR SKILLED THERAPEUTIC INTERVENTIONS
JEREMI/impairments found/rehab potential/anticipated equipment needs at discharge/anticipated discharge recommendation

## 2022-07-12 NOTE — GOALS OF CARE CONVERSATION - ADVANCED CARE PLANNING - CONVERSATION DETAILS
Patient has previously filled out MOLST and living will. Patients sister Nicole Dewitt is his HCP. Confirmed DNR/DNI, "no extraordinary measures". Patient's hope is to return to his baseline and [facility].

## 2022-07-12 NOTE — PHYSICAL THERAPY INITIAL EVALUATION ADULT - LEVEL OF INDEPENDENCE: SIT/SUPINE, REHAB EVAL
2*2 pt became fearful of falling, stopped following instructions and attempted to Sit on EOB despite not being in proper position. Pt required assist x3 to be lifted to EOB then to supine position./maximum assist (25% patients effort)

## 2022-07-12 NOTE — PROGRESS NOTE ADULT - SUBJECTIVE AND OBJECTIVE BOX
INTERVAL HPI/OVERNIGHT EVENTS/SUBJECTIVE:  Pt admitted yesterday for multiple rib fractures and age.  pic score 9.  Pt acutely delirious overnight, received Haldol 2.5mg IVP x2.   Now on 1:1.          ICU Vital Signs Last 24 Hrs  T(C): 36.9 (12 Jul 2022 03:39), Max: 36.9 (11 Jul 2022 20:02)  T(F): 98.5 (12 Jul 2022 03:39), Max: 98.5 (11 Jul 2022 20:02)  HR: 77 (12 Jul 2022 06:00) (60 - 81)  BP: 115/71 (12 Jul 2022 06:00) (91/70 - 143/74)  BP(mean): 85 (12 Jul 2022 06:00) (68 - 100)  ABP: --  ABP(mean): --  RR: 19 (12 Jul 2022 06:00) (13 - 21)  SpO2: 92% (12 Jul 2022 06:00) (90% - 100%)    O2 Parameters below as of 11 Jul 2022 20:00  Patient On (Oxygen Delivery Method): room air            I&O's Detail    11 Jul 2022 07:01  -  12 Jul 2022 06:27  --------------------------------------------------------  IN:    Oral Fluid: 150 mL  Total IN: 150 mL    OUT:    Voided (mL): 1250 mL  Total OUT: 1250 mL    Total NET: -1100 mL                MEDICATIONS  (STANDING):  acetaminophen     Tablet .. 975 milliGRAM(s) Oral every 6 hours  aMIOdarone    Tablet 200 milliGRAM(s) Oral daily  chlorhexidine 2% Cloths 1 Application(s) Topical daily  donepezil 10 milliGRAM(s) Oral at bedtime  lidocaine   4% Patch 1 Patch Transdermal every 24 hours  melatonin 5 milliGRAM(s) Oral at bedtime  memantine 10 milliGRAM(s) Oral daily  methocarbamol 500 milliGRAM(s) Oral two times a day  metoprolol tartrate 25 milliGRAM(s) Oral two times a day  senna 2 Tablet(s) Oral at bedtime  sertraline 50 milliGRAM(s) Oral daily  simvastatin 20 milliGRAM(s) Oral at bedtime  tamsulosin 0.4 milliGRAM(s) Oral at bedtime    MEDICATIONS  (PRN):  ALPRAZolam 0.25 milliGRAM(s) Oral two times a day PRN anxiety  oxyCODONE    IR 2.5 milliGRAM(s) Oral every 6 hours PRN Moderate Pain (4 - 6)  oxyCODONE    IR 5 milliGRAM(s) Oral every 6 hours PRN Severe Pain (7 - 10)      NUTRITION/IVF: Regular/IVL    CENTRAL LINE:  None    REYNOSO:  None    A-LINE:  None        PHYSICAL EXAM:    Gen:  Agitated, no respiratory distress    Eyes:  EOMI's BL, pupils 4mm round and reactive BL    Neurological:  GCS 14 for confusion, redirectable at times    ENMT:  MMM    Neck:  Supple    Pulmonary:  CTAB, unlabored, Converses without difficulty    Cardiovascular:  NSR    Gastrointestinal:  Soft, NTND    Genitourinary:  Voids      Extremities:  AROM x4    Skin:  intact    Musculoskeletal:  No pedal edema BL          LABS:  CBC Full  -  ( 12 Jul 2022 03:38 )  WBC Count : 9.10 K/uL  RBC Count : 2.62 M/uL  Hemoglobin : 8.4 g/dL  Hematocrit : 25.2 %  Platelet Count - Automated : 169 K/uL  Mean Cell Volume : 96.2 fl  Mean Cell Hemoglobin : 32.1 pg  Mean Cell Hemoglobin Concentration : 33.3 gm/dL  Auto Neutrophil # : 6.60 K/uL  Auto Lymphocyte # : 1.47 K/uL  Auto Monocyte # : 0.87 K/uL  Auto Eosinophil # : 0.11 K/uL  Auto Basophil # : 0.03 K/uL  Auto Neutrophil % : 72.5 %  Auto Lymphocyte % : 16.2 %  Auto Monocyte % : 9.6 %  Auto Eosinophil % : 1.2 %  Auto Basophil % : 0.3 %    07-12    138  |  104  |  18.7  ----------------------------<  107<H>  4.4   |  25.0  |  0.69    Ca    8.4<L>      12 Jul 2022 03:38  Phos  2.0     07-12  Mg     2.2     07-12    TPro  6.4<L>  /  Alb  3.8  /  TBili  0.5  /  DBili  x   /  AST  15  /  ALT  13  /  AlkPhos  116  07-10    PT/INR - ( 11 Jul 2022 13:11 )   PT: 13.6 sec;   INR: 1.17 ratio         PTT - ( 11 Jul 2022 13:11 )  PTT:33.5 sec    RECENT CULTURES:      LIVER FUNCTIONS - ( 10 Jul 2022 22:44 )  Alb: 3.8 g/dL / Pro: 6.4 g/dL / ALK PHOS: 116 U/L / ALT: 13 U/L / AST: 15 U/L / GGT: x           CARDIAC MARKERS ( 11 Jul 2022 13:11 )  x     / <0.01 ng/mL / x     / x     / x      CARDIAC MARKERS ( 10 Jul 2022 22:44 )  x     / <0.01 ng/mL / 63 U/L / x     / x          CAPILLARY BLOOD GLUCOSE      RADIOLOGY & ADDITIONAL STUDIES:    ASSESSMENT/PLAN:  82yMale presenting with: acute traumatic rib fxs, chest wall hematoma    Neuro:    CV:    Pulm:    GI/Nutrition:    /Renal:    ID:    Lines/Tubes:    Endo:    Skin:    Proph:    Dispo:      CRITICAL CARE TIME SPENT:   INTERVAL HPI/OVERNIGHT EVENTS/SUBJECTIVE:  Pt admitted yesterday for multiple rib fractures and age.  pic score 9.  Pt acutely delirious overnight, received Haldol 2.5mg IVP x2.   Now on 1:1.          ICU Vital Signs Last 24 Hrs  T(C): 36.9 (12 Jul 2022 03:39), Max: 36.9 (11 Jul 2022 20:02)  T(F): 98.5 (12 Jul 2022 03:39), Max: 98.5 (11 Jul 2022 20:02)  HR: 77 (12 Jul 2022 06:00) (60 - 81)  BP: 115/71 (12 Jul 2022 06:00) (91/70 - 143/74)  BP(mean): 85 (12 Jul 2022 06:00) (68 - 100)  ABP: --  ABP(mean): --  RR: 19 (12 Jul 2022 06:00) (13 - 21)  SpO2: 92% (12 Jul 2022 06:00) (90% - 100%)    O2 Parameters below as of 11 Jul 2022 20:00  Patient On (Oxygen Delivery Method): room air            I&O's Detail    11 Jul 2022 07:01  -  12 Jul 2022 06:27  --------------------------------------------------------  IN:    Oral Fluid: 150 mL  Total IN: 150 mL    OUT:    Voided (mL): 1250 mL  Total OUT: 1250 mL    Total NET: -1100 mL                MEDICATIONS  (STANDING):  acetaminophen     Tablet .. 975 milliGRAM(s) Oral every 6 hours  aMIOdarone    Tablet 200 milliGRAM(s) Oral daily  chlorhexidine 2% Cloths 1 Application(s) Topical daily  donepezil 10 milliGRAM(s) Oral at bedtime  lidocaine   4% Patch 1 Patch Transdermal every 24 hours  melatonin 5 milliGRAM(s) Oral at bedtime  memantine 10 milliGRAM(s) Oral daily  methocarbamol 500 milliGRAM(s) Oral two times a day  metoprolol tartrate 25 milliGRAM(s) Oral two times a day  senna 2 Tablet(s) Oral at bedtime  sertraline 50 milliGRAM(s) Oral daily  simvastatin 20 milliGRAM(s) Oral at bedtime  tamsulosin 0.4 milliGRAM(s) Oral at bedtime    MEDICATIONS  (PRN):  ALPRAZolam 0.25 milliGRAM(s) Oral two times a day PRN anxiety  oxyCODONE    IR 2.5 milliGRAM(s) Oral every 6 hours PRN Moderate Pain (4 - 6)  oxyCODONE    IR 5 milliGRAM(s) Oral every 6 hours PRN Severe Pain (7 - 10)      NUTRITION/IVF: Regular/IVL    CENTRAL LINE:  None    REYNOSO:  None    A-LINE:  None        PHYSICAL EXAM:    Gen:  Agitated, no respiratory distress    Eyes:  EOMI's BL, pupils 4mm round and reactive BL    Neurological:  GCS 14 for confusion, redirectable at times    ENMT:  MMM    Neck:  Supple    Pulmonary:  CTAB, unlabored, Converses without difficulty    Cardiovascular:  NSR    Gastrointestinal:  Soft, NTND    Genitourinary:  Voids      Extremities:  AROM x4    Skin:  intact    Musculoskeletal:  No pedal edema BL          LABS:  CBC Full  -  ( 12 Jul 2022 03:38 )  WBC Count : 9.10 K/uL  RBC Count : 2.62 M/uL  Hemoglobin : 8.4 g/dL  Hematocrit : 25.2 %  Platelet Count - Automated : 169 K/uL  Mean Cell Volume : 96.2 fl  Mean Cell Hemoglobin : 32.1 pg  Mean Cell Hemoglobin Concentration : 33.3 gm/dL  Auto Neutrophil # : 6.60 K/uL  Auto Lymphocyte # : 1.47 K/uL  Auto Monocyte # : 0.87 K/uL  Auto Eosinophil # : 0.11 K/uL  Auto Basophil # : 0.03 K/uL  Auto Neutrophil % : 72.5 %  Auto Lymphocyte % : 16.2 %  Auto Monocyte % : 9.6 %  Auto Eosinophil % : 1.2 %  Auto Basophil % : 0.3 %    07-12    138  |  104  |  18.7  ----------------------------<  107<H>  4.4   |  25.0  |  0.69    Ca    8.4<L>      12 Jul 2022 03:38  Phos  2.0     07-12  Mg     2.2     07-12    TPro  6.4<L>  /  Alb  3.8  /  TBili  0.5  /  DBili  x   /  AST  15  /  ALT  13  /  AlkPhos  116  07-10    PT/INR - ( 11 Jul 2022 13:11 )   PT: 13.6 sec;   INR: 1.17 ratio         PTT - ( 11 Jul 2022 13:11 )  PTT:33.5 sec    RECENT CULTURES:      LIVER FUNCTIONS - ( 10 Jul 2022 22:44 )  Alb: 3.8 g/dL / Pro: 6.4 g/dL / ALK PHOS: 116 U/L / ALT: 13 U/L / AST: 15 U/L / GGT: x           CARDIAC MARKERS ( 11 Jul 2022 13:11 )  x     / <0.01 ng/mL / x     / x     / x      CARDIAC MARKERS ( 10 Jul 2022 22:44 )  x     / <0.01 ng/mL / 63 U/L / x     / x          CAPILLARY BLOOD GLUCOSE      RADIOLOGY & ADDITIONAL STUDIES:    ASSESSMENT/PLAN:  82yMale presenting with: acute traumatic rib fxs, chest wall hematoma    Neuro:  Attempt to maintain sleep/wake cycle.  Haldol may be a good alternative to Precedex.  Consider rib block if pt not actively taking anything for pain, may help alleviate symptoms of delirium if pain is an underlying cause    CV:  Hemodynamically normal    Pulm:  pic score this am is a 9.  Encourage IS, OOBTC    GI/Nutrition:  Regular diet, bowel regimen    /Renal:  Voids    ID:  None    Lines/Tubes:  PIV    Endo:  no issues    Skin:  intact    Proph:  SCDs, Lovenox today     Heme:  CBC stable    Dispo:  Pt stable for transfer to floor.  Will need 1:1 for acute delirium and pt safety.      CRITICAL CARE TIME SPENT:

## 2022-07-12 NOTE — CHART NOTE - NSCHARTNOTEFT_GEN_A_CORE
SICU TRANSFER NOTE  -----------------------------  ICU Admission Date: 7/11/2022  Transfer Date: 07-12-22 @ 15:38    Admission Diagnosis: Trauma, s/p fall    Active Problems/injuries: anterolateral L 6-9 rib fx, L pos. 7-9 fx, and chest hematoma     Procedures: None    Consultants: None    Medications  acetaminophen     Tablet .. 975 milliGRAM(s) Oral every 6 hours  aMIOdarone    Tablet 200 milliGRAM(s) Oral daily  donepezil 10 milliGRAM(s) Oral at bedtime  enoxaparin Injectable 40 milliGRAM(s) SubCutaneous every 24 hours  haloperidol    Injectable 2.5 milliGRAM(s) IV Push once  lidocaine   4% Patch 1 Patch Transdermal every 24 hours  melatonin 5 milliGRAM(s) Oral at bedtime  memantine 10 milliGRAM(s) Oral every 12 hours  methocarbamol 500 milliGRAM(s) Oral two times a day  metoprolol tartrate 25 milliGRAM(s) Oral two times a day  oxyCODONE    IR 2.5 milliGRAM(s) Oral every 6 hours PRN  oxyCODONE    IR 5 milliGRAM(s) Oral every 6 hours PRN  senna 2 Tablet(s) Oral at bedtime  sertraline 50 milliGRAM(s) Oral daily  simvastatin 20 milliGRAM(s) Oral at bedtime  tamsulosin 0.4 milliGRAM(s) Oral at bedtime      [X] I attest I have reviewed and reconciled all medications prior to transfer    IV Fluids  sodium chloride 0.9% Bolus:   1000 milliLiter(s), IV Bolus, once, infuse over 60 Minute(s), Stop After 1 Doses      I have discussed this case with SICU team upon transfer and all questions regarding ICU course were answered.  The following items are to be followed up:    1. Pain control  2. Reorient patient for hyperactive delirium, continue constant observation. Avoid narcotics and benzos. Hold xanax.   3. Delirium protocol- light on tv on during the day, light off and tv off during the day, try to have appropriate sleep, and normal day and night cycle.   4. PIC protocol  5. PT  6. DVT ppx  7. H/h stable.  8. Gave the patient a one time dose of 2.5 of Haldol due to agitation. Will monitor with EKG for EKG abnormalities(qt prolongation). Downgrade Note  -----------------------------  ICU Admission Date: 7/11/2022  Transfer Date: 07-12-22 @ 15:38  Admission Diagnosis: Trauma, s/p fall  Active Problems/injuries: anterolateral L 6-9 rib fx, L pos. 7-9 fx, and chest hematoma   Procedures: None  Consultants: None    The patient is stable and does not require the level of care needed in the ICU. The patient will be downgraded to the floor for further management. The patient reports that he is not in any pain. Further communication with the patient was interfered by the patient baseline dementia. According to signouts, the patient pain is controlled, H/H is stable, Pic score is 9 ( pain reduced, cough is strong, pulling 2L on IS), and rib block has been received.    Medications  acetaminophen     Tablet .. 975 milliGRAM(s) Oral every 6 hours  aMIOdarone    Tablet 200 milliGRAM(s) Oral daily  donepezil 10 milliGRAM(s) Oral at bedtime  enoxaparin Injectable 40 milliGRAM(s) SubCutaneous every 24 hours  haloperidol    Injectable 2.5 milliGRAM(s) IV Push once  lidocaine   4% Patch 1 Patch Transdermal every 24 hours  melatonin 5 milliGRAM(s) Oral at bedtime  memantine 10 milliGRAM(s) Oral every 12 hours  methocarbamol 500 milliGRAM(s) Oral two times a day  metoprolol tartrate 25 milliGRAM(s) Oral two times a day  oxyCODONE    IR 2.5 milliGRAM(s) Oral every 6 hours PRN  oxyCODONE    IR 5 milliGRAM(s) Oral every 6 hours PRN  senna 2 Tablet(s) Oral at bedtime  sertraline 50 milliGRAM(s) Oral daily  simvastatin 20 milliGRAM(s) Oral at bedtime  tamsulosin 0.4 milliGRAM(s) Oral at bedtime    [X] I attest I have reviewed and reconciled all medications prior to transfer    IV Fluids  sodium chloride 0.9% Bolus:   1000 milliLiter(s), IV Bolus, once, infuse over 60 Minute(s), Stop After 1 Doses    Gen: Baseline dementia, agitated   HEENT: atraumatic head, trachea midline  Respiratory: nonlabored breathing, no cough noted  Chest: large hematoma location under the left pectoralis muscle that is dark purple in coloration, equal chest rise  Gas: soft, non-tender, non distended  Mus/skel: moving all extremities, full range of motion  Neuro: agitated, will not answer orientation questions    I have discussed this case with SICU team upon transfer and all questions regarding ICU course were answered.   The following items are to be followed up:    1. Pain control  2. Reorient patient for hyperactive delirium, continue constant observation. Avoid narcotics and benzos. Hold xanax.   3. Delirium protocol- light on tv on during the day, light off and tv off during the day, try to have appropriate sleep, and normal day and night cycle.   4. PIC protocol  5. PT  6. DVT ppx  7. H/H stable.  8. Gave the patient a one time dose of 2.5 of Haldol due to agitation. Will monitor with EKG for EKG abnormalities(qt prolongation).

## 2022-07-12 NOTE — PHYSICAL THERAPY INITIAL EVALUATION ADULT - ADDITIONAL COMMENTS
Pt is a poor historian, unable to give PLOF or social hx. Per Documentation pt came from [facility]. Pt has a son, Jerod and Sister Nicole who are supportive. Per previous PT evaluation from 6/6/22 Pt lives alone in a condo with no stairs and was independent without a device as of June 2022.

## 2022-07-12 NOTE — PHYSICAL THERAPY INITIAL EVALUATION ADULT - PERTINENT HX OF CURRENT PROBLEM, REHAB EVAL
82 yo M who presents to the ED s/p unwitnesssed fall while at [facility] facility (patient was admitted in june s/p fall with L 6-9 rib fractures) CT re-demonstrates L posterior 7-9 anterolateral rib fractures, L 6-9 anterolateral rib fractures with interval displacement, small L pleural effusion and large Left chest wall hematoma

## 2022-07-12 NOTE — CHART NOTE - NSCHARTNOTEFT_GEN_A_CORE
SICU TRANSFER NOTE  -----------------------------  ICU Admission Date:  7/11  Transfer Date: 07-12-22 @ 15:26    Admission Diagnosis: Rib fractures, chest wall hematoma     Active Problems/injuries: Left posterior 7th-9th and anterolateral left 6th-9th rib   fractures.    Procedures: NOne    Consultants:  [ ] Cardiology  [ ] Endocrine  [ ] Infectious Disease  [ ] Medicine  [ ]Neurosurgery  [ ] Ortho       [ ] Weight Bearing Status:  [ ] Palliative       [ ] Advanced Directives:    [ ] Physical Medicine and Rehab       [ ] Disposition :   [ ] Plastics  [ ] Pulmonary    Medications  acetaminophen     Tablet .. 975 milliGRAM(s) Oral every 6 hours  aMIOdarone    Tablet 200 milliGRAM(s) Oral daily  donepezil 10 milliGRAM(s) Oral at bedtime  enoxaparin Injectable 40 milliGRAM(s) SubCutaneous every 24 hours  lidocaine   4% Patch 1 Patch Transdermal every 24 hours  melatonin 5 milliGRAM(s) Oral at bedtime  memantine 10 milliGRAM(s) Oral every 12 hours  methocarbamol 500 milliGRAM(s) Oral two times a day  metoprolol tartrate 25 milliGRAM(s) Oral two times a day  oxyCODONE    IR 2.5 milliGRAM(s) Oral every 6 hours PRN  oxyCODONE    IR 5 milliGRAM(s) Oral every 6 hours PRN  senna 2 Tablet(s) Oral at bedtime  sertraline 50 milliGRAM(s) Oral daily  simvastatin 20 milliGRAM(s) Oral at bedtime  tamsulosin 0.4 milliGRAM(s) Oral at bedtime      [ x] I attest I have reviewed and reconciled all medications prior to transfer      I have discussed this case with ACS/trauma team, all present upon transfer and all questions regarding ICU course were answered.  The following items are to be followed up:  1. Pain control  2. Reorient patient for hyperactive delirium, continue constant observation. Avoid narcotics and benzos. Hold xanax.   3. PIC protocol  4. PT  5. DVT ppx  6. H/h stable

## 2022-07-12 NOTE — PHYSICAL THERAPY INITIAL EVALUATION ADULT - GENERAL OBSERVATIONS, REHAB EVAL
Pt received supine in bed, + telemetry//BP + 1:1 present. C/o unrated pain pre and Post PT, (+) Pain meds on board per RN. Pt A&O x1, easily distracted and with decreased safety awareness however, easily redirectable

## 2022-07-13 ENCOUNTER — TRANSCRIPTION ENCOUNTER (OUTPATIENT)
Age: 82
End: 2022-07-13

## 2022-07-13 LAB
ANION GAP SERPL CALC-SCNC: 11 MMOL/L — SIGNIFICANT CHANGE UP (ref 5–17)
BASOPHILS # BLD AUTO: 0.03 K/UL — SIGNIFICANT CHANGE UP (ref 0–0.2)
BASOPHILS NFR BLD AUTO: 0.4 % — SIGNIFICANT CHANGE UP (ref 0–2)
BUN SERPL-MCNC: 19.5 MG/DL — SIGNIFICANT CHANGE UP (ref 8–20)
CALCIUM SERPL-MCNC: 8.1 MG/DL — LOW (ref 8.6–10.2)
CHLORIDE SERPL-SCNC: 101 MMOL/L — SIGNIFICANT CHANGE UP (ref 98–107)
CO2 SERPL-SCNC: 23 MMOL/L — SIGNIFICANT CHANGE UP (ref 22–29)
CREAT SERPL-MCNC: 0.64 MG/DL — SIGNIFICANT CHANGE UP (ref 0.5–1.3)
EGFR: 95 ML/MIN/1.73M2 — SIGNIFICANT CHANGE UP
EOSINOPHIL # BLD AUTO: 0.11 K/UL — SIGNIFICANT CHANGE UP (ref 0–0.5)
EOSINOPHIL NFR BLD AUTO: 1.4 % — SIGNIFICANT CHANGE UP (ref 0–6)
GLUCOSE SERPL-MCNC: 103 MG/DL — HIGH (ref 70–99)
HCT VFR BLD CALC: 23.5 % — LOW (ref 39–50)
HGB BLD-MCNC: 8 G/DL — LOW (ref 13–17)
IMM GRANULOCYTES NFR BLD AUTO: 0.5 % — SIGNIFICANT CHANGE UP (ref 0–1.5)
LYMPHOCYTES # BLD AUTO: 1.24 K/UL — SIGNIFICANT CHANGE UP (ref 1–3.3)
LYMPHOCYTES # BLD AUTO: 16.3 % — SIGNIFICANT CHANGE UP (ref 13–44)
MAGNESIUM SERPL-MCNC: 2.1 MG/DL — SIGNIFICANT CHANGE UP (ref 1.8–2.6)
MCHC RBC-ENTMCNC: 33.2 PG — SIGNIFICANT CHANGE UP (ref 27–34)
MCHC RBC-ENTMCNC: 34 GM/DL — SIGNIFICANT CHANGE UP (ref 32–36)
MCV RBC AUTO: 97.5 FL — SIGNIFICANT CHANGE UP (ref 80–100)
MONOCYTES # BLD AUTO: 0.79 K/UL — SIGNIFICANT CHANGE UP (ref 0–0.9)
MONOCYTES NFR BLD AUTO: 10.4 % — SIGNIFICANT CHANGE UP (ref 2–14)
NEUTROPHILS # BLD AUTO: 5.42 K/UL — SIGNIFICANT CHANGE UP (ref 1.8–7.4)
NEUTROPHILS NFR BLD AUTO: 71 % — SIGNIFICANT CHANGE UP (ref 43–77)
PHOSPHATE SERPL-MCNC: 2 MG/DL — LOW (ref 2.4–4.7)
PLATELET # BLD AUTO: 179 K/UL — SIGNIFICANT CHANGE UP (ref 150–400)
POTASSIUM SERPL-MCNC: 3.7 MMOL/L — SIGNIFICANT CHANGE UP (ref 3.5–5.3)
POTASSIUM SERPL-SCNC: 3.7 MMOL/L — SIGNIFICANT CHANGE UP (ref 3.5–5.3)
RBC # BLD: 2.41 M/UL — LOW (ref 4.2–5.8)
RBC # FLD: 14.1 % — SIGNIFICANT CHANGE UP (ref 10.3–14.5)
SARS-COV-2 RNA SPEC QL NAA+PROBE: SIGNIFICANT CHANGE UP
SODIUM SERPL-SCNC: 135 MMOL/L — SIGNIFICANT CHANGE UP (ref 135–145)
WBC # BLD: 7.63 K/UL — SIGNIFICANT CHANGE UP (ref 3.8–10.5)
WBC # FLD AUTO: 7.63 K/UL — SIGNIFICANT CHANGE UP (ref 3.8–10.5)

## 2022-07-13 RX ORDER — QUETIAPINE FUMARATE 200 MG/1
12.5 TABLET, FILM COATED ORAL EVERY 24 HOURS
Refills: 0 | Status: DISCONTINUED | OUTPATIENT
Start: 2022-07-13 | End: 2022-07-14

## 2022-07-13 RX ORDER — LANOLIN ALCOHOL/MO/W.PET/CERES
1 CREAM (GRAM) TOPICAL
Qty: 0 | Refills: 0 | DISCHARGE
Start: 2022-07-13

## 2022-07-13 RX ORDER — ACETAMINOPHEN 500 MG
3 TABLET ORAL
Qty: 0 | Refills: 0 | DISCHARGE
Start: 2022-07-13

## 2022-07-13 RX ORDER — POTASSIUM PHOSPHATE, MONOBASIC POTASSIUM PHOSPHATE, DIBASIC 236; 224 MG/ML; MG/ML
30 INJECTION, SOLUTION INTRAVENOUS ONCE
Refills: 0 | Status: COMPLETED | OUTPATIENT
Start: 2022-07-13 | End: 2022-07-13

## 2022-07-13 RX ORDER — SENNA PLUS 8.6 MG/1
2 TABLET ORAL
Qty: 0 | Refills: 0 | DISCHARGE
Start: 2022-07-13

## 2022-07-13 RX ADMIN — SERTRALINE 50 MILLIGRAM(S): 25 TABLET, FILM COATED ORAL at 10:41

## 2022-07-13 RX ADMIN — QUETIAPINE FUMARATE 12.5 MILLIGRAM(S): 200 TABLET, FILM COATED ORAL at 10:42

## 2022-07-13 RX ADMIN — Medication 975 MILLIGRAM(S): at 06:08

## 2022-07-13 RX ADMIN — LIDOCAINE 1 PATCH: 4 CREAM TOPICAL at 05:18

## 2022-07-13 RX ADMIN — Medication 975 MILLIGRAM(S): at 18:35

## 2022-07-13 RX ADMIN — Medication 975 MILLIGRAM(S): at 02:24

## 2022-07-13 RX ADMIN — METHOCARBAMOL 500 MILLIGRAM(S): 500 TABLET, FILM COATED ORAL at 18:35

## 2022-07-13 RX ADMIN — ENOXAPARIN SODIUM 40 MILLIGRAM(S): 100 INJECTION SUBCUTANEOUS at 10:41

## 2022-07-13 RX ADMIN — SENNA PLUS 2 TABLET(S): 8.6 TABLET ORAL at 22:39

## 2022-07-13 RX ADMIN — Medication 975 MILLIGRAM(S): at 00:37

## 2022-07-13 RX ADMIN — Medication 25 MILLIGRAM(S): at 18:35

## 2022-07-13 RX ADMIN — Medication 975 MILLIGRAM(S): at 15:10

## 2022-07-13 RX ADMIN — MEMANTINE HYDROCHLORIDE 10 MILLIGRAM(S): 10 TABLET ORAL at 05:17

## 2022-07-13 RX ADMIN — METHOCARBAMOL 500 MILLIGRAM(S): 500 TABLET, FILM COATED ORAL at 05:17

## 2022-07-13 RX ADMIN — Medication 25 MILLIGRAM(S): at 05:17

## 2022-07-13 RX ADMIN — Medication 5 MILLIGRAM(S): at 22:39

## 2022-07-13 RX ADMIN — MEMANTINE HYDROCHLORIDE 10 MILLIGRAM(S): 10 TABLET ORAL at 18:35

## 2022-07-13 RX ADMIN — SIMVASTATIN 20 MILLIGRAM(S): 20 TABLET, FILM COATED ORAL at 22:40

## 2022-07-13 RX ADMIN — Medication 975 MILLIGRAM(S): at 10:42

## 2022-07-13 RX ADMIN — Medication 975 MILLIGRAM(S): at 05:15

## 2022-07-13 RX ADMIN — POTASSIUM PHOSPHATE, MONOBASIC POTASSIUM PHOSPHATE, DIBASIC 83.33 MILLIMOLE(S): 236; 224 INJECTION, SOLUTION INTRAVENOUS at 10:42

## 2022-07-13 RX ADMIN — TAMSULOSIN HYDROCHLORIDE 0.4 MILLIGRAM(S): 0.4 CAPSULE ORAL at 22:40

## 2022-07-13 RX ADMIN — DONEPEZIL HYDROCHLORIDE 10 MILLIGRAM(S): 10 TABLET, FILM COATED ORAL at 22:40

## 2022-07-13 RX ADMIN — AMIODARONE HYDROCHLORIDE 200 MILLIGRAM(S): 400 TABLET ORAL at 05:17

## 2022-07-13 NOTE — DISCHARGE NOTE PROVIDER - HOSPITAL COURSE
Mr. Ferguson is an 82 yo M who presents to the ED s/p unwitnessed fall while at [facility] facility (where patient was admitted in June s/p fall with L 6-9 rib fractures). Patient was found down, time unknown with complaints of L chest wall pain. CT re-demonstrates L posterior 7-9 anterolateral rib fractures, L 6-9 anterolateral rib fractures with interval displacement, small L pleural effusion (atelecstasis vs hemothorax) with no associated pneumothorax with 19x9x5.5cm chest wall hematoma. Pan scan otherwise negative for any acute traumatic injury.   Hospital coarse:  Pt. with baseline dementia. AoX1.  Pt. admitted to SICU for pic protocol, serial labs and for hemodynamic monitoring secondary to large chest wall hematoma.  Pt. remained hemodynamically stable in the ICU with pic scores of 9.  He did become confused and agitated requiring some doses of haldol.  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  Pt. was placed on constant observation and transferred to the floor where he did well.  Pain was well controlled, he tolerated a diet and is stable for discharge to [facility].

## 2022-07-13 NOTE — DISCHARGE NOTE PROVIDER - CARE PROVIDERS DIRECT ADDRESSES
Addended by: KAREN HERNANDEZ on: 11/29/2021 12:58 PM     Modules accepted: Edgardo, SmartSet     ,DirectAddress_Unknown

## 2022-07-13 NOTE — PROGRESS NOTE ADULT - SUBJECTIVE AND OBJECTIVE BOX
SUBJECTIVE/24 hour events:  Patient is a 82yMale s/p fall at momentum sustaining multiple left sided rib fractures and left chest wall hematoma, downgraded from the sicu yesterday with acute events. Patient remains on 1:1 for delirium and impulsivity, does know his birthday and that the year is 2022, calm and cooperative overnight requiring no medications for agitation. Patient seen by PT good to go back to his [facility].     Vital Signs Last 24 Hrs  T(C): 37.6 (12 Jul 2022 18:35), Max: 37.6 (12 Jul 2022 18:35)  T(F): 99.6 (12 Jul 2022 18:35), Max: 99.6 (12 Jul 2022 18:35)  HR: 70 (12 Jul 2022 18:35) (67 - 81)  BP: 106/52 (12 Jul 2022 18:35) (93/76 - 132/75)  BP(mean): 64 (12 Jul 2022 17:00) (64 - 99)  RR: 19 (12 Jul 2022 18:35) (14 - 24)  SpO2: 95% (12 Jul 2022 18:35) (84% - 100%)    Parameters below as of 12 Jul 2022 18:35  Patient On (Oxygen Delivery Method): room air      Drug Dosing Weight  Height (cm): 177.8 (10 Jul 2022 21:05)  Weight (kg): 86.2 (10 Jul 2022 21:05)  BMI (kg/m2): 27.3 (10 Jul 2022 21:05)  BSA (m2): 2.04 (10 Jul 2022 21:05)  I&O's Detail    11 Jul 2022 07:01  -  12 Jul 2022 07:00  --------------------------------------------------------  IN:    Oral Fluid: 150 mL  Total IN: 150 mL    OUT:    Voided (mL): 1250 mL  Total OUT: 1250 mL    Total NET: -1100 mL      12 Jul 2022 07:01  -  13 Jul 2022 00:34  --------------------------------------------------------  IN:    Oral Fluid: 350 mL  Total IN: 350 mL    OUT:    Voided (mL): 500 mL  Total OUT: 500 mL    Total NET: -150 mL        Allergies    No Known Allergies    Intolerances                              8.4    9.10  )-----------( 169      ( 12 Jul 2022 03:38 )             25.2   07-12    138  |  104  |  18.7  ----------------------------<  107<H>  4.4   |  25.0  |  0.69    Ca    8.4<L>      12 Jul 2022 03:38  Phos  2.0     07-12  Mg     2.2     07-12    PT/INR - ( 11 Jul 2022 13:11 )   PT: 13.6 sec;   INR: 1.17 ratio         PTT - ( 11 Jul 2022 13:11 )  PTT:33.5 sec    ROS:    PHYSICAL EXAM:  Constitutional: calm, in good spirits " what about that baseball game tomorrow"  Respiratory: no respiratory distress, no dyspnea, no supplemental o2 needed, pic score8-9  Gastrointestinal: abdomen soft, non-tender, atraumatic   Genitourinary: voiding  Extremities: moving all extremities with no difficulties   Neurological: orientated to birthday and that it is 2022, does not know where he is, calm and cooperative   Skin: warm, dry and no rashes            MEDICATIONS  (STANDING):  acetaminophen     Tablet .. 975 milliGRAM(s) Oral every 6 hours  aMIOdarone    Tablet 200 milliGRAM(s) Oral daily  donepezil 10 milliGRAM(s) Oral at bedtime  enoxaparin Injectable 40 milliGRAM(s) SubCutaneous every 24 hours  lidocaine   4% Patch 1 Patch Transdermal every 24 hours  melatonin 5 milliGRAM(s) Oral at bedtime  memantine 10 milliGRAM(s) Oral every 12 hours  methocarbamol 500 milliGRAM(s) Oral two times a day  metoprolol tartrate 25 milliGRAM(s) Oral two times a day  senna 2 Tablet(s) Oral at bedtime  sertraline 50 milliGRAM(s) Oral daily  simvastatin 20 milliGRAM(s) Oral at bedtime  tamsulosin 0.4 milliGRAM(s) Oral at bedtime    MEDICATIONS  (PRN):  oxyCODONE    IR 2.5 milliGRAM(s) Oral every 6 hours PRN Moderate Pain (4 - 6)  oxyCODONE    IR 5 milliGRAM(s) Oral every 6 hours PRN Severe Pain (7 - 10)      RADIOLOGY STUDIES:    CULTURES:

## 2022-07-13 NOTE — DISCHARGE NOTE PROVIDER - NSDCMRMEDTOKEN_GEN_ALL_CORE_FT
acetaminophen 325 mg oral tablet: 3 tab(s) orally every 6 hours  amiodarone 200 mg oral tablet: 1 tab(s) orally once a day  aspirin 81 mg oral delayed release tablet: 1 tab(s) orally once a day  donepezil 10 mg oral tablet: 1 tab(s) orally once a day  Flomax 0.4 mg oral capsule: 1 cap(s) orally once a day   melatonin 5 mg oral tablet: 1 tab(s) orally once a day (at bedtime)  Namenda 10 mg oral tablet: 1 tab(s) orally 2 times a day  senna leaf extract oral tablet: 2 tab(s) orally once a day (at bedtime)  sertraline 50 mg oral tablet: 1 tab(s) orally once a day  Toprol-XL 50 mg oral tablet, extended release: 1 tab(s) orally once a day  Zocor 20 mg oral tablet: 1 tab(s) orally once a day (at bedtime)

## 2022-07-13 NOTE — DISCHARGE NOTE PROVIDER - CARE PROVIDER_API CALL
Latrice Rangel (MD)  Surgery; Surgical Critical Care  [address] [place] [id number]  Phone: [phone]  Fax: [phone]  Follow Up Time: Routine

## 2022-07-13 NOTE — PROGRESS NOTE ADULT - ATTENDING COMMENTS
Patient is a 82yMale s/p fall at momentum sustaining multiple left sided rib fractures and left chest wall hematoma, downgraded from the sicu yesterday with acute events. Patient remains on 1:1 for delirium and impulsivity, does know his birthday and that the year is 2022, calm and cooperative overnight requiring no medications for agitation. Patient seen by PT  Awake and confused with advanced dementia  Hemodynamic intact  Bilateral BS and good resp function, no splinting, good cough  Abdomen soft  Transfer to [facility]
Patient with dementia and multiple rib fxs L/ soft tissue chest wall hematoma  Awake but confused and non-communicative  Hemodynamic intact  Bilateral BS  Abdomen soft  Plan  Home meds  Pain control  PTOT  Transfer to floor

## 2022-07-13 NOTE — PROGRESS NOTE ADULT - ASSESSMENT
Patient is a 82 M s/p fall at momentum sustaining left rib fractures and chest wall hematoma, downgraded from the SICU on 7/11  1. Pain control  2. Reorient patient for hyperactive delirium, continue constant observation. Avoid narcotics and benzos. Hold xanax.   3. Delirium protocol- light on tv on during the day, light off and tv off during the day, try to have appropriate sleep, and normal day and night cycle.   4. PIC protocol  5. PT  6. DVT ppx  7. H/H stable.  dispo to [facility]

## 2022-07-14 ENCOUNTER — TRANSCRIPTION ENCOUNTER (OUTPATIENT)
Age: 82
End: 2022-07-14

## 2022-07-14 VITALS
TEMPERATURE: 98 F | SYSTOLIC BLOOD PRESSURE: 155 MMHG | OXYGEN SATURATION: 96 % | RESPIRATION RATE: 17 BRPM | DIASTOLIC BLOOD PRESSURE: 76 MMHG | HEART RATE: 73 BPM

## 2022-07-14 LAB
BLD GP AB SCN SERPL QL: SIGNIFICANT CHANGE UP
HCT VFR BLD CALC: 28.2 % — LOW (ref 39–50)
HGB BLD-MCNC: 9.1 G/DL — LOW (ref 13–17)
MCHC RBC-ENTMCNC: 32 PG — SIGNIFICANT CHANGE UP (ref 27–34)
MCHC RBC-ENTMCNC: 32.3 GM/DL — SIGNIFICANT CHANGE UP (ref 32–36)
MCV RBC AUTO: 99.3 FL — SIGNIFICANT CHANGE UP (ref 80–100)
PLATELET # BLD AUTO: 206 K/UL — SIGNIFICANT CHANGE UP (ref 150–400)
RBC # BLD: 2.84 M/UL — LOW (ref 4.2–5.8)
RBC # FLD: 14.4 % — SIGNIFICANT CHANGE UP (ref 10.3–14.5)
WBC # BLD: 8.62 K/UL — SIGNIFICANT CHANGE UP (ref 3.8–10.5)
WBC # FLD AUTO: 8.62 K/UL — SIGNIFICANT CHANGE UP (ref 3.8–10.5)

## 2022-07-14 PROCEDURE — 85025 COMPLETE CBC W/AUTO DIFF WBC: CPT

## 2022-07-14 PROCEDURE — 71045 X-RAY EXAM CHEST 1 VIEW: CPT

## 2022-07-14 PROCEDURE — 80053 COMPREHEN METABOLIC PANEL: CPT

## 2022-07-14 PROCEDURE — 72125 CT NECK SPINE W/O DYE: CPT | Mod: MG

## 2022-07-14 PROCEDURE — 36415 COLL VENOUS BLD VENIPUNCTURE: CPT

## 2022-07-14 PROCEDURE — U0003: CPT

## 2022-07-14 PROCEDURE — G1004: CPT

## 2022-07-14 PROCEDURE — 82550 ASSAY OF CK (CPK): CPT

## 2022-07-14 PROCEDURE — 86901 BLOOD TYPING SEROLOGIC RH(D): CPT

## 2022-07-14 PROCEDURE — 97163 PT EVAL HIGH COMPLEX 45 MIN: CPT

## 2022-07-14 PROCEDURE — 70450 CT HEAD/BRAIN W/O DYE: CPT | Mod: MG

## 2022-07-14 PROCEDURE — 99231 SBSQ HOSP IP/OBS SF/LOW 25: CPT | Mod: FS

## 2022-07-14 PROCEDURE — 74177 CT ABD & PELVIS W/CONTRAST: CPT | Mod: MG

## 2022-07-14 PROCEDURE — 83735 ASSAY OF MAGNESIUM: CPT

## 2022-07-14 PROCEDURE — 71260 CT THORAX DX C+: CPT | Mod: MG

## 2022-07-14 PROCEDURE — 99285 EMERGENCY DEPT VISIT HI MDM: CPT | Mod: 25

## 2022-07-14 PROCEDURE — 87640 STAPH A DNA AMP PROBE: CPT

## 2022-07-14 PROCEDURE — 84100 ASSAY OF PHOSPHORUS: CPT

## 2022-07-14 PROCEDURE — 93005 ELECTROCARDIOGRAM TRACING: CPT

## 2022-07-14 PROCEDURE — 84484 ASSAY OF TROPONIN QUANT: CPT

## 2022-07-14 PROCEDURE — 85610 PROTHROMBIN TIME: CPT

## 2022-07-14 PROCEDURE — 85027 COMPLETE CBC AUTOMATED: CPT

## 2022-07-14 PROCEDURE — 80048 BASIC METABOLIC PNL TOTAL CA: CPT

## 2022-07-14 PROCEDURE — 85730 THROMBOPLASTIN TIME PARTIAL: CPT

## 2022-07-14 PROCEDURE — U0005: CPT

## 2022-07-14 PROCEDURE — 86850 RBC ANTIBODY SCREEN: CPT

## 2022-07-14 PROCEDURE — 86900 BLOOD TYPING SEROLOGIC ABO: CPT

## 2022-07-14 PROCEDURE — 87641 MR-STAPH DNA AMP PROBE: CPT

## 2022-07-14 RX ADMIN — Medication 975 MILLIGRAM(S): at 05:37

## 2022-07-14 RX ADMIN — METHOCARBAMOL 500 MILLIGRAM(S): 500 TABLET, FILM COATED ORAL at 18:07

## 2022-07-14 RX ADMIN — METHOCARBAMOL 500 MILLIGRAM(S): 500 TABLET, FILM COATED ORAL at 05:38

## 2022-07-14 RX ADMIN — QUETIAPINE FUMARATE 12.5 MILLIGRAM(S): 200 TABLET, FILM COATED ORAL at 09:52

## 2022-07-14 RX ADMIN — Medication 975 MILLIGRAM(S): at 18:07

## 2022-07-14 RX ADMIN — MEMANTINE HYDROCHLORIDE 10 MILLIGRAM(S): 10 TABLET ORAL at 18:07

## 2022-07-14 RX ADMIN — Medication 975 MILLIGRAM(S): at 14:02

## 2022-07-14 RX ADMIN — Medication 975 MILLIGRAM(S): at 06:34

## 2022-07-14 RX ADMIN — Medication 975 MILLIGRAM(S): at 00:47

## 2022-07-14 RX ADMIN — LIDOCAINE 1 PATCH: 4 CREAM TOPICAL at 05:44

## 2022-07-14 RX ADMIN — Medication 975 MILLIGRAM(S): at 18:38

## 2022-07-14 RX ADMIN — Medication 975 MILLIGRAM(S): at 12:42

## 2022-07-14 RX ADMIN — Medication 975 MILLIGRAM(S): at 00:03

## 2022-07-14 RX ADMIN — Medication 25 MILLIGRAM(S): at 18:08

## 2022-07-14 RX ADMIN — ENOXAPARIN SODIUM 40 MILLIGRAM(S): 100 INJECTION SUBCUTANEOUS at 09:52

## 2022-07-14 RX ADMIN — AMIODARONE HYDROCHLORIDE 200 MILLIGRAM(S): 400 TABLET ORAL at 05:38

## 2022-07-14 RX ADMIN — Medication 25 MILLIGRAM(S): at 05:39

## 2022-07-14 RX ADMIN — MEMANTINE HYDROCHLORIDE 10 MILLIGRAM(S): 10 TABLET ORAL at 05:39

## 2022-07-14 RX ADMIN — SERTRALINE 50 MILLIGRAM(S): 25 TABLET, FILM COATED ORAL at 12:43

## 2022-07-14 NOTE — PROGRESS NOTE ADULT - SUBJECTIVE AND OBJECTIVE BOX
SUBJECTIVE/24 hour events:  Patient is a 82yMale s/p fall at momentum sustaining multiple left sided rib fractures and left chest wall hematoma, downgraded from the sicu yesterday with acute events. Patient now off 1:1 and doing well.  Knows his birthday and that the year is 2022, calm and cooperative overnight requiring no medications for agitation. Patient seen by PT good to go back to his [facility].       Vital Signs Last 24 Hrs  T(C): 36.9 (13 Jul 2022 21:48), Max: 37 (13 Jul 2022 12:07)  T(F): 98.4 (13 Jul 2022 21:48), Max: 98.6 (13 Jul 2022 12:07)  HR: 65 (13 Jul 2022 21:48) (63 - 83)  BP: 95/60 (13 Jul 2022 21:48) (95/60 - 152/78)  BP(mean): --  RR: 18 (13 Jul 2022 21:48) (16 - 19)  SpO2: 93% (13 Jul 2022 21:48) (93% - 100%)    Parameters below as of 13 Jul 2022 21:48  Patient On (Oxygen Delivery Method): room air      Drug Dosing Weight  Height (cm): 177.8 (10 Jul 2022 21:05)  Weight (kg): 86.2 (10 Jul 2022 21:05)  BMI (kg/m2): 27.3 (10 Jul 2022 21:05)  BSA (m2): 2.04 (10 Jul 2022 21:05)  I&O's Detail    12 Jul 2022 07:01  -  13 Jul 2022 07:00  --------------------------------------------------------  IN:    Oral Fluid: 350 mL  Total IN: 350 mL    OUT:    Voided (mL): 500 mL  Total OUT: 500 mL    Total NET: -150 mL      13 Jul 2022 07:01  -  14 Jul 2022 00:22  --------------------------------------------------------  IN:  Total IN: 0 mL    OUT:    Voided (mL): 350 mL  Total OUT: 350 mL    Total NET: -350 mL        Allergies    No Known Allergies    Intolerances                              8.0    7.63  )-----------( 179      ( 13 Jul 2022 08:04 )             23.5   07-13    135  |  101  |  19.5  ----------------------------<  103<H>  3.7   |  23.0  |  0.64    Ca    8.1<L>      13 Jul 2022 06:32  Phos  2.0     07-13  Mg     2.1     07-13        ROS:  PHYSICAL EXAM:  Constitutional: resting comfortably   Respiratory: no respiratory distress, no dyspnea, no supplemental o2 needed, pic score8-9  Gastrointestinal: abdomen soft, non-tender, atraumatic   Genitourinary: voiding  Extremities: moving all extremities with no difficulties   Neurological: orientated to birthday and that it is 2022, does not know where he is, calm and cooperative   Skin: warm, dry and no rashes                    MEDICATIONS  (STANDING):  acetaminophen     Tablet .. 975 milliGRAM(s) Oral every 6 hours  aMIOdarone    Tablet 200 milliGRAM(s) Oral daily  donepezil 10 milliGRAM(s) Oral at bedtime  enoxaparin Injectable 40 milliGRAM(s) SubCutaneous every 24 hours  lidocaine   4% Patch 1 Patch Transdermal every 24 hours  melatonin 5 milliGRAM(s) Oral at bedtime  memantine 10 milliGRAM(s) Oral every 12 hours  methocarbamol 500 milliGRAM(s) Oral two times a day  metoprolol tartrate 25 milliGRAM(s) Oral two times a day  QUEtiapine 12.5 milliGRAM(s) Oral every 24 hours  senna 2 Tablet(s) Oral at bedtime  sertraline 50 milliGRAM(s) Oral daily  simvastatin 20 milliGRAM(s) Oral at bedtime  tamsulosin 0.4 milliGRAM(s) Oral at bedtime    MEDICATIONS  (PRN):  oxyCODONE    IR 2.5 milliGRAM(s) Oral every 6 hours PRN Moderate Pain (4 - 6)  oxyCODONE    IR 5 milliGRAM(s) Oral every 6 hours PRN Severe Pain (7 - 10)      RADIOLOGY STUDIES:    CULTURES:

## 2022-07-14 NOTE — PROGRESS NOTE ADULT - REASON FOR ADMISSION
Rib fractures, L chest wall hematoma

## 2022-07-14 NOTE — PROGRESS NOTE ADULT - ASSESSMENT
Patient is a 82 M s/p fall at momentum sustaining left rib fractures and chest wall hematoma, downgraded from the SICU on 7/11  1. Pain control  2. Reorient patient for hyperactive delirium, . Avoid narcotics and benzos. Hold xanax.   3. Delirium protocol- light on tv on during the day, light off and tv off during the day, try to have appropriate sleep, and normal day and night cycle.   4. PIC protocol  5. PT  6. DVT ppx  7. H/H stable.  dispo to [facility]

## 2022-07-14 NOTE — DISCHARGE NOTE NURSING/CASE MANAGEMENT/SOCIAL WORK - NSDCPEFALRISK_GEN_ALL_CORE
For information on Fall & Injury Prevention, visit: [email or web address] OR  [email or web address] OR  https://www.cdc.gov/steadi/patient.html

## 2022-07-14 NOTE — DISCHARGE NOTE NURSING/CASE MANAGEMENT/SOCIAL WORK - PATIENT PORTAL LINK FT
You can access the FollowMyHealth Patient Portal offered by [facility] by registering at the following website: [email or web address]. By joining [facility]’s FollowMyHealth portal, you will also be able to view your health information using other applications (apps) compatible with our system.

## 2022-07-14 NOTE — PROGRESS NOTE ADULT - NS ATTEND AMEND GEN_ALL_CORE FT
Patient seen on am rounds. Delirious, oriented to self. Stable for d/c back to [facility], however family picking out new facility. d/c pending.

## 2022-07-25 NOTE — CDI QUERY NOTE - NSCDIOTHERTXTBX_GEN_ALL_CORE_HH
Discharge Note Provider [Charted Location: [facility] 2GUL [id number] [id number]] [Authored: 13-Jul-2022 10:11]  Hospital coarse:  Pt. with baseline dementia. AoX1.  Pt. admitted to SICU for pic protocol, serial labs and for hemodynamic monitoring secondary to large chest wall hematoma.  Pt. remained hemodynamically stable in the ICU with pic scores of 9.  He did become confused and agitated requiring some doses of haldol.  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          Patient with dementia was confused and agitated requiring some doses of Haldol, can you please further clarify if there was a clinically significant findings to describe patient’s behavior?  A.	Dementia with behavioral disturbance  B.	Other, please specify  C.	Not clinically significant

## 2022-07-26 NOTE — CHART NOTE - NSCHARTNOTEFT_GEN_A_CORE
Please note upon review of chart the following diagnosis exists for this patient:  1.	Dementia with behavioral disturbance    Discharge Note Provider [Charted Location: [facility] 2GUL 2307 01] [Authored: 13-Jul-2022 10:11]  Hospital coarse:  Pt. with baseline dementia. AoX1.  Pt. admitted to SICU for pic protocol, serial labs and for hemodynamic monitoring secondary to large chest wall hematoma.  Pt. remained hemodynamically stable in the ICU with pic scores of 9.  He did become confused and agitated requiring some doses of haldol.  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

## 2022-07-29 NOTE — DISCHARGE NOTE NURSING/CASE MANAGEMENT/SOCIAL WORK - NSDCPEPT PROEDMA_GEN_ALL_CORE
ANTHONY FROM [facility] CALLED WANTING TO SEE IF WE RECEIVED A STATIN THERAPY ALERT FOR [facility].    PLEASE CALL BACK [phone]  
No

## 2023-06-07 NOTE — CDI QUERY NOTE - NSCDITREATMDFT_GEN_ALL_CORE_HH
How Severe Is Your Skin Lesion?: mild
Has Your Skin Lesion Been Treated?: not been treated
Dr. Wise,
Is This A New Presentation, Or A Follow-Up?: Skin Lesion

## 2024-06-03 NOTE — ED PROVIDER NOTE - GENITOURINARY NEGATIVE STATEMENT, MLM
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 right-handed male here for a follow up of his MS, receives Ocrevus infusions and tolerating well.  He reports bilateral leg weakness. He is blind and needs assist with ADL's and IADL's . He gets help at home from family and Aides.Last MRI of the brain was 6/2023 and stable.      ROS  -Urinary Urgency, no bladder issues, uses w/c when out and walker at home, no trouble swallowing, and taking ATB for dental implants.    Objective   Neurological Exam  Mental Status  Alert. Oriented to person, place and time. Oriented to person, place, and time. Recalls 3 of 3 objects immediately. Speech is normal. Language is fluent with no aphasia. Attention and concentration are normal.    Cranial Nerves  CN I: Sense of smell is abnormal.  CN III, IV, VI: Extraocular movements intact bilaterally. Normal lids and orbits bilaterally.  CN V: Facial sensation is normal.  CN VII: Full and symmetric facial movement.  CN VIII: Hearing is normal.  CN IX, X: Palate elevates symmetrically  CN XI: Shoulder shrug strength is normal.  CN XII: Tongue midline without atrophy or fasciculations.  Mr. Peterson is blind..    Motor  Normal muscle bulk throughout. Normal muscle tone. Strength is 5/5 in all four extremities except as noted.                                             Right                     Left  Hip flexion                              4-                          4-  Hip extension                          4-                          4  Hip abduction                         4-                          4  Hip adduction                         4-                          4  Knee flexion                           4                          4  Knee extension                      4                          4-    Sensory  Light touch is normal in upper and lower extremities. Pinprick abnormality:     Reflexes                                            Right                      Left  Brachioradialis                    2+                         2+  Biceps                                 2+                         2+  Triceps                                                        2+  Patellar                                3+                           Right palmar grasp present. Left palmar grasp present.    Coordination  Right: Finger-to-nose normal. Rapid alternating movement normal. Had difficulty preforming d/t sight, but was able to do after daughter instruction..    Gait  Normal casual, toe, heel and tandem gait.  Patient used a w/c to day so was not able to perform 25 ft. Speed test..    Physical Exam  Constitutional:       Appearance: Normal appearance.   HENT:      Head: Normocephalic.      Right Ear: Tympanic membrane normal.      Left Ear: Tympanic membrane normal.      Nose: Nose normal.      Mouth/Throat:      Mouth: Mucous membranes are moist.   Eyes:      General: Lids are normal.      Extraocular Movements: Extraocular movements intact.   Musculoskeletal:      Cervical back: Full passive range of motion without pain and normal range of motion.      Right lower leg: Edema present.      Left lower leg: No edema.      Comments: Bilateral leg swelling.   Skin:     General: Skin is warm and dry.   Neurological:      Mental Status: He is alert and oriented to person, place, and time.      Deep Tendon Reflexes:      Reflex Scores:       Tricep reflexes are 2+ on the left side.       Bicep reflexes are  2+ on the right side and 2+ on the left side.       Brachioradialis reflexes are 2+ on the right side and 2+ on the left side.       Patellar reflexes are 3+ on the right side.  Psychiatric:         Attention and Perception: Attention normal.         Mood and Affect: Mood normal.         Speech: Speech normal.         Behavior: Behavior is cooperative.         Cognition and Memory: Cognition normal.     Provider Impression  John Peterson is a 77 y.o. right-handed male here with his daughter for a follow up of his MS, receives Ocrevus infusions and tolerating well. Last MRI of the brain was 6/2023 and stable.    We discussed the importance of living healthy life style with diet and exercise and the importance of V-D in patient's with MS.    The total face to face appointment was 40 minutes and more than 50% of the visit was spent counseling and coordination of care.    I personally reviewed laboratory, radiographic, and medical studies which were pertinent for today's visit.    Plan  - Continue Ocrevus.  - Labs at infusion.  - MRI Nov. 2023.  - Follow up 6 months.   (+)urinary retention, testicular pain, no dysuria, no frequency, and no hematuria.

## 2024-08-28 NOTE — ED ADULT NURSE NOTE - DOES PATIENT HAVE ADVANCE DIRECTIVE
[Normal] : clear to auscultation bilaterally, no dullness, no wheezing [de-identified] : RRR, normal S1S2 [de-identified] : no pitting edema [de-identified] : Patient with ostomy; soft, normoactive bowel sounds [de-identified] : no rash [de-identified] : A & O x 4 Yes